# Patient Record
Sex: MALE | Race: WHITE | NOT HISPANIC OR LATINO | Employment: OTHER | URBAN - METROPOLITAN AREA
[De-identification: names, ages, dates, MRNs, and addresses within clinical notes are randomized per-mention and may not be internally consistent; named-entity substitution may affect disease eponyms.]

---

## 2017-05-03 ENCOUNTER — ALLSCRIPTS OFFICE VISIT (OUTPATIENT)
Dept: OTHER | Facility: OTHER | Age: 77
End: 2017-05-03

## 2018-01-14 VITALS
WEIGHT: 192 LBS | SYSTOLIC BLOOD PRESSURE: 130 MMHG | HEART RATE: 60 BPM | RESPIRATION RATE: 16 BRPM | TEMPERATURE: 97.6 F | DIASTOLIC BLOOD PRESSURE: 80 MMHG | BODY MASS INDEX: 26.78 KG/M2

## 2018-05-02 ENCOUNTER — OFFICE VISIT (OUTPATIENT)
Dept: SURGICAL ONCOLOGY | Facility: HOSPITAL | Age: 78
End: 2018-05-02
Payer: MEDICARE

## 2018-05-02 VITALS
WEIGHT: 194 LBS | DIASTOLIC BLOOD PRESSURE: 80 MMHG | SYSTOLIC BLOOD PRESSURE: 140 MMHG | HEIGHT: 71 IN | BODY MASS INDEX: 27.16 KG/M2 | TEMPERATURE: 98.2 F | RESPIRATION RATE: 14 BRPM | HEART RATE: 58 BPM

## 2018-05-02 DIAGNOSIS — C43.61 MALIGNANT MELANOMA OF RIGHT UPPER EXTREMITY INCLUDING SHOULDER (HCC): Primary | ICD-10-CM

## 2018-05-02 PROCEDURE — 99214 OFFICE O/P EST MOD 30 MIN: CPT | Performed by: SURGERY

## 2018-05-02 RX ORDER — FOLIC ACID 1 MG/1
TABLET ORAL
COMMUNITY

## 2018-05-02 NOTE — LETTER
May 2, 2018     Marilee Amado, [address] [address]    Patient: Bhavesh Valenzuela   YOB: 1940   Date of Visit: 5/2/2018       Dear Dr Diallo Escobedo: Thank you for referring Danay Maldonado to me for evaluation  Below are my notes for this consultation  If you have questions, please do not hesitate to call me  I look forward to following your patient along with you  Sincerely,        Jeanne Strauss MD        CC: SHERRILL Cazares MD Abigail Kleine, MD  5/2/2018 12:55 PM  Sign at close encounter     Surgical Oncology Follow Up       [place]  [address]  [place] [address]    Bhavesh Valenzuela  1940  [id number]  [place]  [address]  [place] [address]    No chief complaint on file  Assessment/Plan:    No problem-specific Assessment & Plan notes found for this encounter  There are no diagnoses linked to this encounter  Advance Care Planning/Advance Directives:  Discussed disease status, cancer treatment plans and/or cancer treatment goals with the patient  No history exists  History of Present Illness: Diagnosis and Staging: Stage Ia melanoma excised from the right shoulder October 2012   Treatment History: Wide excision right shoulder October 2012   Disease Status: AVINASH     Interval History: Mr Fara Bo is a 76-year-old man with a history of stage I melanoma excised from the right shoulder 4 5 years ago  He's here for surveillance visit with no skin complaints to report  He continues to see me as well as Dr Dago Mosher for skin surveillance  Review of Systems:  Review of Systems   Constitutional: Negative  HENT: Negative  Eyes: Negative  Respiratory: Negative  Cardiovascular: Negative  Gastrointestinal: Negative  Endocrine: Negative  Genitourinary: Negative  Musculoskeletal: Negative  Skin: Negative  Allergic/Immunologic: Negative  Neurological: Negative  Hematological: Negative  Psychiatric/Behavioral: Negative  Patient Active Problem List   Diagnosis    [facility]ural hematoma ([facility] )    Fall    Scalp laceration     Past Medical History:   Diagnosis Date    Gout     Hyperlipidemia     Melanoma ([facility] )      Past Surgical History:   Procedure Laterality Date    KNEE SURGERY Bilateral     SKIN BIOPSY      R SHOULDER -MELANOMA     Family History   Problem Relation Age of Onset    Hyperlipidemia Other      Social History     Social History    Marital status: /Civil Union     Spouse name: N/A    Number of children: N/A    Years of education: N/A     Occupational History    Not on file  Social History Main Topics    Smoking status: Never Smoker    Smokeless tobacco: Never Used    Alcohol use 1 2 oz/week     2 Cans of beer per week    Drug use: No    Sexual activity: Yes     Other Topics Concern    Not on file     Social History Narrative    No narrative on file       Current Outpatient Prescriptions:     acetaminophen (TYLENOL) 325 mg tablet, Take 2 tablets (650 mg total) by mouth every 4 (four) hours as needed for mild pain or headaches , Disp: 30 tablet, Rfl: 0    allopurinol (ZYLOPRIM) 100 mg tablet, Take 100 mg by mouth daily  , Disp: , Rfl:     Cyanocobalamin (RA VITAMIN B-12) 1000 MCG/ML LIQD, Inject as directed, Disp: , Rfl:     folic acid (FOLVITE) 1 mg tablet, Take by mouth, Disp: , Rfl:     rosuvastatin (CRESTOR) 10 MG tablet, Take 10 mg by mouth daily  , Disp: , Rfl:     Tetrahydrozoline-PEG 0 05-1 % SOLN, Apply to eye, Disp: , Rfl:   Allergies   Allergen Reactions    Horse-Derived Products      Annotation - [id number]: "horse serum"     Vitals:    05/02/18 1237   BP: 140/80   Pulse: 58   Resp: 14   Temp: 98 2 °F (36 8 °C) Physical Exam   Constitutional: He is oriented to person, place, and time  He appears well-developed and well-nourished  HENT:   Head: Normocephalic and atraumatic  Right Ear: External ear normal    Left Ear: External ear normal    Eyes: Conjunctivae and EOM are normal  Pupils are equal, round, and reactive to light  Neck: Normal range of motion  Neck supple  Cardiovascular: Normal rate, regular rhythm, normal heart sounds and intact distal pulses  Pulmonary/Chest: Effort normal and breath sounds normal    Abdominal: Soft  Bowel sounds are normal    Musculoskeletal: Normal range of motion  Lymphadenopathy:     He has no cervical adenopathy  Neurological: He is alert and oriented to person, place, and time  He has normal reflexes  Skin: Skin is warm and dry  Right shoulder excision site without evidence of recurrence visible or palpable  Cervical and axillary lymph node basins clinically negative  Psychiatric: He has a normal mood and affect  His behavior is normal  Judgment and thought content normal          Results:  Labs:  none    Imaging  No results found  I reviewed the above laboratory and imaging data  Discussion/Summary:  History of right shoulder melanoma, 5 years post resection  No evidence of recurrence visible or palpable  Presumably he is cured from this at this point  I will therefore follow him on a p r n  basis  He will continue lifelong derm surveillance with Dr Alvaro Benavides

## 2018-05-02 NOTE — PROGRESS NOTES
Surgical Oncology Follow Up       [address] [place] [address] SURGICAL ONCOLOGY [place]  [address]  [facility] [address]    Dennie Roup  1940  [id number]  [address] [place] [address] SURGICAL ONCOLOGY [place]  [address]  [place] [address]    No chief complaint on file  Assessment/Plan:    No problem-specific Assessment & Plan notes found for this encounter  RIGHT SHOULDER MELANOMA     There are no diagnoses linked to this encounter  Advance Care Planning/Advance Directives:  Discussed disease status, cancer treatment plans and/or cancer treatment goals with the patient  No history exists  History of Present Illness: Diagnosis and Staging: Stage Ia melanoma excised from the right shoulder October 2012   Treatment History: Wide excision right shoulder October 2012   Disease Status: AVINASH     Interval History: Mr Meghann Clarke is a 69-year-old man with a history of stage I melanoma excised from the right shoulder 4 5 years ago  He's here for surveillance visit with no skin complaints to report  He continues to see me as well as Dr Travis Petersen for skin surveillance  Review of Systems:  Review of Systems   Constitutional: Negative  HENT: Negative  Eyes: Negative  Respiratory: Negative  Cardiovascular: Negative  Gastrointestinal: Negative  Endocrine: Negative  Genitourinary: Negative  Musculoskeletal: Negative  Skin: Negative  Allergic/Immunologic: Negative  Neurological: Negative  Hematological: Negative  Psychiatric/Behavioral: Negative          Patient Active Problem List   Diagnosis    Subdural hematoma ([address] )    Fall    Scalp laceration     Past Medical History:   Diagnosis Date    Gout     Hyperlipidemia     Melanoma ([address] )      Past Surgical History:   Procedure Laterality Date    KNEE SURGERY Bilateral     SKIN BIOPSY      R SHOULDER -MELANOMA     Family History   Problem Relation Age of Onset    Hyperlipidemia Other      Social History     Social History    Marital status: /Civil Union     Spouse name: N/A    Number of children: N/A    Years of education: N/A     Occupational History    Not on file  Social History Main Topics    Smoking status: Never Smoker    Smokeless tobacco: Never Used    Alcohol use 1 2 oz/week     2 Cans of beer per week    Drug use: No    Sexual activity: Yes     Other Topics Concern    Not on file     Social History Narrative    No narrative on file       Current Outpatient Prescriptions:     acetaminophen (TYLENOL) 325 mg tablet, Take 2 tablets (650 mg total) by mouth every 4 (four) hours as needed for mild pain or headaches , Disp: 30 tablet, Rfl: 0    allopurinol (ZYLOPRIM) 100 mg tablet, Take 100 mg by mouth daily  , Disp: , Rfl:     Cyanocobalamin (RA VITAMIN B-12) 1000 MCG/ML LIQD, Inject as directed, Disp: , Rfl:     folic acid (FOLVITE) 1 mg tablet, Take by mouth, Disp: , Rfl:     rosuvastatin (CRESTOR) 10 MG tablet, Take 10 mg by mouth daily  , Disp: , Rfl:     Tetrahydrozoline-PEG 0 05-1 % SOLN, Apply to eye, Disp: , Rfl:   Allergies   Allergen Reactions    Horse-Derived Products      Annotation - [id number]: "horse serum"     Vitals:    05/02/18 1237   BP: 140/80   Pulse: 58   Resp: 14   Temp: 98 2 °F (36 8 °C)       Physical Exam   Constitutional: He is oriented to person, place, and time  He appears well-developed and well-nourished  HENT:   Head: Normocephalic and atraumatic  Right Ear: External ear normal    Left Ear: External ear normal    Eyes: Conjunctivae and EOM are normal  Pupils are equal, round, and reactive to light  Neck: Normal range of motion  Neck supple  Cardiovascular: Normal rate, regular rhythm, normal heart sounds and intact distal pulses  Pulmonary/Chest: Effort normal and breath sounds normal    Abdominal: Soft   Bowel sounds are normal  Musculoskeletal: Normal range of motion  Lymphadenopathy:     He has no cervical adenopathy  Neurological: He is alert and oriented to person, place, and time  He has normal reflexes  Skin: Skin is warm and dry  Right shoulder excision site without evidence of recurrence visible or palpable  Cervical and axillary lymph node basins clinically negative  Psychiatric: He has a normal mood and affect  His behavior is normal  Judgment and thought content normal          Results:  Labs:  none    Imaging  No results found  I reviewed the above laboratory and imaging data  Discussion/Summary:  History of right shoulder melanoma, 5 years post resection  No evidence of recurrence visible or palpable  Presumably he is cured from this at this point  I will therefore follow him on a p r n  basis  He will continue lifelong derm surveillance with Dr Gurinder Pagan

## 2021-03-30 PROBLEM — C43.59 MALIGNANT MELANOMA OF UPPER BACK (HCC): Status: ACTIVE | Noted: 2021-03-30

## 2021-03-31 ENCOUNTER — CLINICAL SUPPORT (OUTPATIENT)
Dept: URGENT CARE | Facility: MEDICAL CENTER | Age: 81
End: 2021-03-31
Payer: MEDICARE

## 2021-03-31 ENCOUNTER — APPOINTMENT (OUTPATIENT)
Dept: LAB | Facility: MEDICAL CENTER | Age: 81
End: 2021-03-31
Payer: MEDICARE

## 2021-03-31 ENCOUNTER — OFFICE VISIT (OUTPATIENT)
Dept: SURGICAL ONCOLOGY | Facility: CLINIC | Age: 81
End: 2021-03-31
Payer: MEDICARE

## 2021-03-31 ENCOUNTER — APPOINTMENT (OUTPATIENT)
Dept: RADIOLOGY | Facility: MEDICAL CENTER | Age: 81
End: 2021-03-31
Payer: MEDICARE

## 2021-03-31 VITALS
HEIGHT: 71 IN | BODY MASS INDEX: 27.58 KG/M2 | HEART RATE: 64 BPM | RESPIRATION RATE: 16 BRPM | WEIGHT: 197 LBS | DIASTOLIC BLOOD PRESSURE: 88 MMHG | SYSTOLIC BLOOD PRESSURE: 142 MMHG | TEMPERATURE: 97.6 F

## 2021-03-31 DIAGNOSIS — C43.59 MALIGNANT MELANOMA OF UPPER BACK (HCC): ICD-10-CM

## 2021-03-31 DIAGNOSIS — C43.59 MALIGNANT MELANOMA OF UPPER BACK (HCC): Primary | ICD-10-CM

## 2021-03-31 LAB
ALBUMIN SERPL BCP-MCNC: 4.1 G/DL (ref 3.5–5)
ALP SERPL-CCNC: 57 U/L (ref 46–116)
ALT SERPL W P-5'-P-CCNC: 32 U/L (ref 12–78)
ANION GAP SERPL CALCULATED.3IONS-SCNC: 3 MMOL/L (ref 4–13)
AST SERPL W P-5'-P-CCNC: 20 U/L (ref 5–45)
BASOPHILS # BLD AUTO: 0.05 THOUSANDS/ΜL (ref 0–0.1)
BASOPHILS NFR BLD AUTO: 1 % (ref 0–1)
BILIRUB SERPL-MCNC: 0.7 MG/DL (ref 0.2–1)
BUN SERPL-MCNC: 20 MG/DL (ref 5–25)
CALCIUM SERPL-MCNC: 8.8 MG/DL (ref 8.3–10.1)
CHLORIDE SERPL-SCNC: 106 MMOL/L (ref 100–108)
CO2 SERPL-SCNC: 30 MMOL/L (ref 21–32)
CREAT SERPL-MCNC: 1.26 MG/DL (ref 0.6–1.3)
EOSINOPHIL # BLD AUTO: 0.11 THOUSAND/ΜL (ref 0–0.61)
EOSINOPHIL NFR BLD AUTO: 1 % (ref 0–6)
ERYTHROCYTE [DISTWIDTH] IN BLOOD BY AUTOMATED COUNT: 12.7 % (ref 11.6–15.1)
GFR SERPL CREATININE-BSD FRML MDRD: 53 ML/MIN/1.73SQ M
GLUCOSE SERPL-MCNC: 99 MG/DL (ref 65–140)
HCT VFR BLD AUTO: 43.4 % (ref 36.5–49.3)
HGB BLD-MCNC: 14.5 G/DL (ref 12–17)
IMM GRANULOCYTES # BLD AUTO: 0.03 THOUSAND/UL (ref 0–0.2)
IMM GRANULOCYTES NFR BLD AUTO: 0 % (ref 0–2)
LYMPHOCYTES # BLD AUTO: 2.4 THOUSANDS/ΜL (ref 0.6–4.47)
LYMPHOCYTES NFR BLD AUTO: 26 % (ref 14–44)
MCH RBC QN AUTO: 33.1 PG (ref 26.8–34.3)
MCHC RBC AUTO-ENTMCNC: 33.4 G/DL (ref 31.4–37.4)
MCV RBC AUTO: 99 FL (ref 82–98)
MONOCYTES # BLD AUTO: 0.93 THOUSAND/ΜL (ref 0.17–1.22)
MONOCYTES NFR BLD AUTO: 10 % (ref 4–12)
NEUTROPHILS # BLD AUTO: 5.77 THOUSANDS/ΜL (ref 1.85–7.62)
NEUTS SEG NFR BLD AUTO: 62 % (ref 43–75)
NRBC BLD AUTO-RTO: 0 /100 WBCS
PLATELET # BLD AUTO: 238 THOUSANDS/UL (ref 149–390)
PMV BLD AUTO: 11.4 FL (ref 8.9–12.7)
POTASSIUM SERPL-SCNC: 4.5 MMOL/L (ref 3.5–5.3)
PROT SERPL-MCNC: 7.8 G/DL (ref 6.4–8.2)
RBC # BLD AUTO: 4.38 MILLION/UL (ref 3.88–5.62)
SODIUM SERPL-SCNC: 139 MMOL/L (ref 136–145)
WBC # BLD AUTO: 9.29 THOUSAND/UL (ref 4.31–10.16)

## 2021-03-31 PROCEDURE — 36415 COLL VENOUS BLD VENIPUNCTURE: CPT

## 2021-03-31 PROCEDURE — 93005 ELECTROCARDIOGRAM TRACING: CPT

## 2021-03-31 PROCEDURE — 85025 COMPLETE CBC W/AUTO DIFF WBC: CPT

## 2021-03-31 PROCEDURE — 80053 COMPREHEN METABOLIC PANEL: CPT

## 2021-03-31 PROCEDURE — 71046 X-RAY EXAM CHEST 2 VIEWS: CPT

## 2021-03-31 PROCEDURE — 99214 OFFICE O/P EST MOD 30 MIN: CPT | Performed by: SURGERY

## 2021-03-31 RX ORDER — LISINOPRIL 5 MG/1
5 TABLET ORAL DAILY
COMMUNITY
Start: 2021-03-10

## 2021-03-31 RX ORDER — MULTIVITAMIN
1 TABLET ORAL DAILY
COMMUNITY

## 2021-03-31 RX ORDER — PSEUDOEPHEDRINE HYDROCHLORIDE 60 MG/1
TABLET, FILM COATED ORAL
COMMUNITY

## 2021-03-31 NOTE — PATIENT INSTRUCTIONS
Pre-Surgery Instructions:   Medication Instructions    allopurinol (ZYLOPRIM) 100 mg tablet Take morning of surgery    Cyanocobalamin (RA VITAMIN B-12) 1000 MCG/ML LIQD Stop taking 1 week prior to surgery    folic acid (FOLVITE) 1 mg tablet Stop taking 1 week prior to surgery    lisinopril (ZESTRIL) 5 mg tablet Stop taking 1 days prior to surgery    Multiple Vitamin (multivitamin) tablet Stop taking 1 week prior to surgery    Propylene Glycol (Systane Balance) 0 6 % SOLN Stop taking 1 days prior to surgery    rosuvastatin (CRESTOR) 10 MG tablet Stop taking 1 days prior to surgery     Excision of Skin Lesion   WHAT YOU NEED TO KNOW:   Excision of a skin lesion is surgery to remove a piece of skin tissue  The skin tissue may be malignant (skin cancer) or it may be benign  Benign means the skin tissue does not have cancer cells and cannot spread  DISCHARGE INSTRUCTIONS:   Seek care immediately if:   · Your stitches come apart and the wound opens  Contact your healthcare provider if:   · You have pain in your incision that does not get better with medicine  · You have a fever or chills  · Your wound is red, swollen, bleeding, or draining pus  · You have questions or concerns about your condition or care  Care for your wound as directed:  Carefully wash the wound with soap and water  Dry the area and put on new, clean bandages as directed  Change your bandages when they get wet or dirty  You may take a shower 24 hours after  your surgery  Do not  soak in water (bathtub, hot tub, swimming pool) until after your stitches have been removed  Check your wound for signs of infection such as redness, swelling, or pus drainage  Follow up with your healthcare provider as directed: You will need to return to have your stitches removed  Write down your questions so you remember to ask them during your visits    © Copyright [address] Information is for End User's use only and may not be sold, redistributed or otherwise used for commercial purposes  All illustrations and images included in CareNotes® are the copyrighted property of A D A M , Inc  or Catina Oconnor  The above information is an  only  It is not intended as medical advice for individual conditions or treatments  Talk to your doctor, nurse or pharmacist before following any medical regimen to see if it is safe and effective for you  [place] Lymph Node Biopsy   WHAT YOU NEED TO KNOW:   A sentinel lymph node (SLN) biopsy can help show if cancer has spread to other places in your body  This information can help your healthcare provider decide what treatments you need  DISCHARGE INSTRUCTIONS:   Seek care immediately if:   · Blood soaks through your bandage  · Your stitches come apart  · Your bruise suddenly gets larger and feels firm  Contact your healthcare provider if:   · You have a fever or chills  · Your wound is red, swollen, or draining pus  · You have nausea or are vomiting  · Your skin is itchy, swollen, or you have a rash  · Your pain does not get better after you take medicine for pain  · You have questions or concerns about your condition or care  Medicines: You may need any of the following:  · NSAIDs , such as ibuprofen, help decrease swelling, pain, and fever  This medicine is available with or without a doctor's order  NSAIDs can cause stomach bleeding or kidney problems in certain people  If you take blood thinner medicine, always ask your healthcare provider if NSAIDs are safe for you  Always read the medicine label and follow directions  · Acetaminophen  decreases pain and fever  It is available without a doctor's order  Ask how much to take and how often to take it  Follow directions  Read the labels of all other medicines you are using to see if they also contain acetaminophen, or ask your doctor or pharmacist  Acetaminophen can cause liver damage if not taken correctly  Do not use more than 4 grams (4,000 milligrams) total of acetaminophen in one day  · Prescription pain medicine  may be given  Ask your healthcare provider how to take this medicine safely  Some prescription pain medicines contain acetaminophen  Do not take other medicines that contain acetaminophen without talking to your healthcare provider  Too much acetaminophen may cause liver damage  Prescription pain medicine may cause constipation  Ask your healthcare provider how to prevent or treat constipation  · Take your medicine as directed  Contact your healthcare provider if you think your medicine is not helping or if you have side effects  Tell him or her if you are allergic to any medicine  Keep a list of the medicines, vitamins, and herbs you take  Include the amounts, and when and why you take them  Bring the list or the pill bottles to follow-up visits  Carry your medicine list with you in case of an emergency  Care for your wound as directed:  Ask your healthcare provider when your incision can get wet  Carefully wash around the incision with soap and water  It is okay to let soap and water gently run over your incision  Do not  scrub your incision  Gently pat dry the area and put on new, clean bandages as directed  Change your bandages when they get wet or dirty  If you have strips of medical tape, let them fall of on their own  It may take 10 to 14 days for them to fall off  Check your incision every day for signs of infection, such as redness, swelling, or pus  Do not put powders or lotions on your incision  If lymph nodes have been taken from your armpit, ask your healthcare provider when you can wear deodorant  Self-care:   · Apply ice  on your incision for 15 to 20 minutes every hour or as directed  Use an ice pack, or put crushed ice in a plastic bag  Cover it with a towel before you apply it to your skin  Ice helps prevent tissue damage and decreases swelling and pain      · Elevate  your arm or leg nearest to the biopsy site as often as you can  This will help decrease swelling and pain  Prop your arm or leg on pillows or blankets to keep it elevated above the level of your heart comfortably  · Do not do strenuous activities  for 24 to 48 hours  Strenuous activities include heavy lifting, sports, or running  If lymph nodes were taken from your armpit, do not push or pull with your arm  These activities may put too much stress on your incision  Rest and take short walks around the house  Ask your healthcare provider when you can return to your normal activities  · Drink plenty of liquids  as directed  This will help flush out contrast liquid from your body  Ask how much liquid to drink each day and which liquids are best for you  Ask your healthcare provider how to prevent lymphedema and infection:  Lymphedema is fluid buildup in fatty tissues under your skin  Lymphedema may happen where lymph nodes were removed or in the arm or leg closest to this area  An infection in your skin can make lymphedema worse  Ask your healthcare provider how you can decrease your risk for skin infections and lymphedema  Follow up with your healthcare provider as directed:  Write down your questions so you remember to ask them during your visits  © Copyright [address] Information is for End User's use only and may not be sold, redistributed or otherwise used for commercial purposes  All illustrations and images included in CareNotes® are the copyrighted property of A D A M , Inc  or Catina Oconnor  The above information is an  only  It is not intended as medical advice for individual conditions or treatments  Talk to your doctor, nurse or pharmacist before following any medical regimen to see if it is safe and effective for you

## 2021-03-31 NOTE — H&P (VIEW-ONLY)
Surgical Oncology Follow Up       [address] [facility] SURGICAL ONCOLOGY [place]  [facility] [address]    Zenaida Jacobo  1940  [id number]  [facility] SURGICAL ONCOLOGY [place]  [place] [address]    Chief Complaint   Patient presents with    Follow-up     New melanoma of upper back       Assessment/Plan:    No problem-specific Assessment & Plan notes found for this encounter  Diagnoses and all orders for this visit:    Malignant melanoma of upper back ([address] )    Other orders  -     Multiple Vitamin (multivitamin) tablet; Take 1 tablet by mouth daily  -     lisinopril (ZESTRIL) 5 mg tablet; Take 5 mg by mouth daily   -     Propylene Glycol (Systane Balance) 0 6 % SOLN; Apply to eye        Advance Care Planning/Advance Directives:  Discussed disease status, cancer treatment plans and/or cancer treatment goals with the patient  Oncology History   Malignant melanoma of upper back ([address] )   3/12/2021 Initial Diagnosis    Malignant melanoma of upper back ([address] )         History of Present Illness:   Patient is an 5year-old man with history of stage I melanoma of back post prior excision in 2012  On recent derm surveillance visit, he was found have a new lesion over his upper back, in the midline  Biopsy was done confirming 0 3 mm deep melanoma with no mitoses, but with ulceration  He has been referred for evaluation and treatment     -Interval History:He follows regularly with Dr Livier Tony for surveillance  Review of Systems:  Review of Systems   Constitutional: Negative  HENT: Negative  Eyes: Negative  Respiratory: Negative  Cardiovascular: Negative  Gastrointestinal: Negative  Endocrine: Negative  Genitourinary: Negative  Musculoskeletal: Negative  Skin:        New back melanoma   Neurological: Negative  Hematological: Negative  Psychiatric/Behavioral: Negative  Patient Active Problem List   Diagnosis    [facility]dural hematoma ([facility] )    Fall, accidental    Scalp laceration    Gout    Hypercholesterolemia    Tinnitus    Hearing loss    Malignant melanoma of upper back ([facility] )     Past Medical History:   Diagnosis Date    Gout     Hyperlipidemia     Melanoma ([facility] )      Past Surgical History:   Procedure Laterality Date    KNEE SURGERY Bilateral     SKIN BIOPSY      R SHOULDER -MELANOMA     Family History   Problem Relation Age of Onset    Hyperlipidemia Other      Social History     Socioeconomic History    Marital status: /Civil Union     Spouse name: Not on file    Number of children: Not on file    Years of education: Not on file    Highest education level: Not on file   Occupational History    Not on file   Social Needs    Financial resource strain: Not on file    Food insecurity     Worry: Not on file     Inability: Not on file    Transportation needs     Medical: Not on file     Non-medical: Not on file   Tobacco Use    Smoking status: Never Smoker    Smokeless tobacco: Never Used   Substance and Sexual Activity    Alcohol use:  Yes     Alcohol/week: 2 0 standard drinks     Types: 2 Cans of beer per week    Drug use: No    Sexual activity: Yes   Lifestyle    Physical activity     Days per week: Not on file     Minutes per session: Not on file    Stress: Not on file   Relationships    Social connections     Talks on phone: Not on file     Gets together: Not on file     Attends [employer] service: Not on file     Active member of club or organization: Not on file     Attends meetings of clubs or organizations: Not on file     Relationship status: Not on file    Intimate partner violence     Fear of current or ex partner: Not on file     Emotionally abused: Not on file     Physically abused: Not on file     Forced sexual activity: Not on file   Other Topics Concern    Not on file   Social History Narrative    Not on file       Current Outpatient Medications:     allopurinol (ZYLOPRIM) 100 mg tablet, Take 100 mg by mouth daily  , Disp: , Rfl:     Cyanocobalamin (RA VITAMIN B-12) 1000 MCG/ML LIQD, Inject as directed, Disp: , Rfl:     folic acid (FOLVITE) 1 mg tablet, Take by mouth, Disp: , Rfl:     lisinopril (ZESTRIL) 5 mg tablet, Take 5 mg by mouth daily , Disp: , Rfl:     Multiple Vitamin (multivitamin) tablet, Take 1 tablet by mouth daily, Disp: , Rfl:     Propylene Glycol (Systane Balance) 0 6 % SOLN, Apply to eye, Disp: , Rfl:     rosuvastatin (CRESTOR) 10 MG tablet, Take 10 mg by mouth daily  , Disp: , Rfl:   Allergies   Allergen Reactions    Horse-Derived Products      Annotation - [id number]: "horse serum"     Vitals:    03/31/21 1321   BP: 142/88   Pulse: 64   Resp: 16   Temp: 97 6 °F (36 4 °C)       Physical Exam  Constitutional:       Appearance: Normal appearance  HENT:      Head: Normocephalic and atraumatic  Right Ear: External ear normal       Left Ear: External ear normal       Nose: Nose normal    Eyes:      General: No scleral icterus  Extraocular Movements: Extraocular movements intact  Pupils: Pupils are equal, round, and reactive to light  Neck:      Musculoskeletal: Normal range of motion and neck supple  No neck rigidity  Cardiovascular:      Rate and Rhythm: Normal rate and regular rhythm  Heart sounds: Normal heart sounds  Pulmonary:      Effort: Pulmonary effort is normal       Breath sounds: Normal breath sounds  Abdominal:      General: Abdomen is flat  Palpations: Abdomen is soft  Musculoskeletal: Normal range of motion  Lymphadenopathy:      Cervical: No cervical adenopathy  Skin:     General: Skin is warm and dry  Comments:   Midline, upper back, scab, no satellite lesions  Negative for cervical or axillary adenopathy bilaterally  Neurological:      General: No focal deficit present  Mental Status: He is alert and oriented to person, place, and time  Psychiatric:         Mood and Affect: Mood normal          Behavior: Behavior normal          Thought Content: Thought content normal          Judgment: Judgment normal            Results:  Labs:   biopsy dated March 12 2021 superior thoracic spine confirming malignant melanoma approximately 0 3 mm in depth, 0 mitoses, but with ulceration  Imaging  No results found  I reviewed the above laboratory and imaging data  Discussion/Summary:  79-year-old man, new T1a melanoma, but with ulceration  Wide excision with sentinel node biopsy mandated  Rationale for this along with risks and benefits of surgery including infection, bleeding, need for possible additional surgery, discussed with him and his wife  They understand the plan and wish to proceed as outlined  All questions answered and consents signed at this visit

## 2021-03-31 NOTE — PROGRESS NOTES
Surgical Oncology Follow Up       [address] [facility] [place] [facility] [place]  [facility] [address]    Reina Lerner  1940  [id number]  [facility] SURGICAL ONCOLOGY [place]  [place] [address]    Chief Complaint   Patient presents with    Follow-up     New melanoma of upper back       Assessment/Plan:    No problem-specific Assessment & Plan notes found for this encounter  Diagnoses and all orders for this visit:    Malignant melanoma of upper back ([address] )    Other orders  -     Multiple Vitamin (multivitamin) tablet; Take 1 tablet by mouth daily  -     lisinopril (ZESTRIL) 5 mg tablet; Take 5 mg by mouth daily   -     Propylene Glycol (Systane Balance) 0 6 % SOLN; Apply to eye        Advance Care Planning/Advance Directives:  Discussed disease status, cancer treatment plans and/or cancer treatment goals with the patient  Oncology History   Malignant melanoma of upper back ([address] )   3/12/2021 Initial Diagnosis    Malignant melanoma of upper back ([address] )         History of Present Illness:   Patient is an 5year-old man with history of stage I melanoma of back post prior excision in 2012  On recent derm surveillance visit, he was found have a new lesion over his upper back, in the midline  Biopsy was done confirming 0 3 mm deep melanoma with no mitoses, but with ulceration  He has been referred for evaluation and treatment     -Interval History:He follows regularly with Dr Eurm Pearce for surveillance  Review of Systems:  Review of Systems   Constitutional: Negative  HENT: Negative  Eyes: Negative  Respiratory: Negative  Cardiovascular: Negative  Gastrointestinal: Negative  Endocrine: Negative  Genitourinary: Negative  Musculoskeletal: Negative  Skin:        New back melanoma   Neurological: Negative  Hematological: Negative  Psychiatric/Behavioral: Negative  Patient Active Problem List   Diagnosis    Subdural hematoma ([facility] )    Fall, accidental    Scalp laceration    Gout    Hypercholesterolemia    Tinnitus    Hearing loss    Malignant melanoma of upper back ([facility] )     Past Medical History:   Diagnosis Date    Gout     Hyperlipidemia     Melanoma ([facility] )      Past Surgical History:   Procedure Laterality Date    KNEE SURGERY Bilateral     SKIN BIOPSY      R SHOULDER -MELANOMA     Family History   Problem Relation Age of Onset    Hyperlipidemia Other      Social History     Socioeconomic History    Marital status: /Civil Union     Spouse name: Not on file    Number of children: Not on file    Years of education: Not on file    Highest education level: Not on file   Occupational History    Not on file   Social Needs    Financial resource strain: Not on file    Food insecurity     Worry: Not on file     Inability: Not on file    Transportation needs     Medical: Not on file     Non-medical: Not on file   Tobacco Use    Smoking status: Never Smoker    Smokeless tobacco: Never Used   Substance and Sexual Activity    Alcohol use:  Yes     Alcohol/week: 2 0 standard drinks     Types: 2 Cans of beer per week    Drug use: No    Sexual activity: Yes   Lifestyle    Physical activity     Days per week: Not on file     Minutes per session: Not on file    Stress: Not on file   Relationships    Social connections     Talks on phone: Not on file     Gets together: Not on file     Attends Jehovah's witness service: Not on file     Active member of club or organization: Not on file     Attends meetings of clubs or organizations: Not on file     Relationship status: Not on file    Intimate partner violence     Fear of current or ex partner: Not on file     Emotionally abused: Not on file     Physically abused: Not on file     Forced sexual activity: Not on file   Other Topics Concern    Not on file   Social History Narrative    Not on file       Current Outpatient Medications:     allopurinol (ZYLOPRIM) 100 mg tablet, Take 100 mg by mouth daily  , Disp: , Rfl:     Cyanocobalamin (RA VITAMIN B-12) 1000 MCG/ML LIQD, Inject as directed, Disp: , Rfl:     folic acid (FOLVITE) 1 mg tablet, Take by mouth, Disp: , Rfl:     lisinopril (ZESTRIL) 5 mg tablet, Take 5 mg by mouth daily , Disp: , Rfl:     Multiple Vitamin (multivitamin) tablet, Take 1 tablet by mouth daily, Disp: , Rfl:     Propylene Glycol (Systane Balance) 0 6 % SOLN, Apply to eye, Disp: , Rfl:     rosuvastatin (CRESTOR) 10 MG tablet, Take 10 mg by mouth daily  , Disp: , Rfl:   Allergies   Allergen Reactions    Horse-Derived Products      Annotation - [id number]: "horse serum"     Vitals:    03/31/21 1321   BP: 142/88   Pulse: 64   Resp: 16   Temp: 97 6 °F (36 4 °C)       Physical Exam  Constitutional:       Appearance: Normal appearance  HENT:      Head: Normocephalic and atraumatic  Right Ear: External ear normal       Left Ear: External ear normal       Nose: Nose normal    Eyes:      General: No scleral icterus  Extraocular Movements: Extraocular movements intact  Pupils: Pupils are equal, round, and reactive to light  Neck:      Musculoskeletal: Normal range of motion and neck supple  No neck rigidity  Cardiovascular:      Rate and Rhythm: Normal rate and regular rhythm  Heart sounds: Normal heart sounds  Pulmonary:      Effort: Pulmonary effort is normal       Breath sounds: Normal breath sounds  Abdominal:      General: Abdomen is flat  Palpations: Abdomen is soft  Musculoskeletal: Normal range of motion  Lymphadenopathy:      Cervical: No cervical adenopathy  Skin:     General: Skin is warm and dry  Comments:   Midline, upper back, scab, no satellite lesions  Negative for cervical or axillary adenopathy bilaterally  Neurological:      General: No focal deficit present  Mental Status: He is alert and oriented to person, place, and time  Psychiatric:         Mood and Affect: Mood normal          Behavior: Behavior normal          Thought Content: Thought content normal          Judgment: Judgment normal            Results:  Labs:   biopsy dated March 12 2021 superior thoracic spine confirming malignant melanoma approximately 0 3 mm in depth, 0 mitoses, but with ulceration  Imaging  No results found  I reviewed the above laboratory and imaging data  Discussion/Summary:  79-year-old man, new T1a melanoma, but with ulceration  Wide excision with sentinel node biopsy mandated  Rationale for this along with risks and benefits of surgery including infection, bleeding, need for possible additional surgery, discussed with him and his wife  They understand the plan and wish to proceed as outlined  All questions answered and consents signed at this visit

## 2021-04-01 LAB
ATRIAL RATE: 63 BPM
P AXIS: 30 DEGREES
PR INTERVAL: 140 MS
QRS AXIS: -4 DEGREES
QRSD INTERVAL: 88 MS
QT INTERVAL: 426 MS
QTC INTERVAL: 435 MS
T WAVE AXIS: 33 DEGREES
VENTRICULAR RATE: 63 BPM

## 2021-04-01 PROCEDURE — 93010 ELECTROCARDIOGRAM REPORT: CPT | Performed by: INTERNAL MEDICINE

## 2021-04-08 ENCOUNTER — ANESTHESIA EVENT (OUTPATIENT)
Dept: PERIOP | Facility: HOSPITAL | Age: 81
End: 2021-04-08
Payer: MEDICARE

## 2021-04-08 NOTE — PRE-PROCEDURE INSTRUCTIONS
Pre-Surgery Instructions:   Medication Instructions    allopurinol (ZYLOPRIM) 100 mg tablet Instructed patient per Anesthesia Guidelines   Cyanocobalamin (RA VITAMIN B-12) 1000 MCG/ML LIQD Instructed patient per Anesthesia Guidelines   folic acid (FOLVITE) 1 mg tablet Instructed patient per Anesthesia Guidelines   lisinopril (ZESTRIL) 5 mg tablet Instructed patient per Anesthesia Guidelines   Multiple Vitamin (multivitamin) tablet Instructed patient per Anesthesia Guidelines   Propylene Glycol (Systane Balance) 0 6 % SOLN Instructed patient per Anesthesia Guidelines   rosuvastatin (CRESTOR) 10 MG tablet Instructed patient per Anesthesia Guidelines  Spoke with patient medication list reviewed  Pt will stop all vitamins and nsaids 1 week prior to surgery  pt and I discussed he will not take any medications  DOS  Mounika  Lisinipril  Npo after midnight  Pt denies covid symptoms, he has been vaccinated 2nd shot March 8, 2021  Pt understands shower instructions 1 adult visitor policy and the need for a ride home after surgery  Address given [address] [facility]  [facility] will call Monday between 2p-7p with arrival time

## 2021-04-13 ENCOUNTER — ANESTHESIA (OUTPATIENT)
Dept: PERIOP | Facility: HOSPITAL | Age: 81
End: 2021-04-13
Payer: MEDICARE

## 2021-04-13 ENCOUNTER — HOSPITAL ENCOUNTER (OUTPATIENT)
Dept: RADIOLOGY | Facility: HOSPITAL | Age: 81
Discharge: HOME | End: 2021-04-13
Attending: SURGERY
Payer: MEDICARE

## 2021-04-13 ENCOUNTER — HOSPITAL ENCOUNTER (OUTPATIENT)
Facility: HOSPITAL | Age: 81
Setting detail: OUTPATIENT SURGERY
Discharge: HOME | End: 2021-04-14
Attending: SURGERY | Admitting: SURGERY
Payer: MEDICARE

## 2021-04-13 DIAGNOSIS — C43.59 MALIGNANT MELANOMA OF UPPER BACK (HCC): ICD-10-CM

## 2021-04-13 DIAGNOSIS — C43.59 MALIGNANT MELANOMA OF UPPER BACK (HCC): Primary | ICD-10-CM

## 2021-04-13 PROCEDURE — 88342 IMHCHEM/IMCYTCHM 1ST ANTB: CPT | Performed by: PATHOLOGY

## 2021-04-13 PROCEDURE — A9541 TC99M SULFUR COLLOID: HCPCS

## 2021-04-13 PROCEDURE — 88305 TISSUE EXAM BY PATHOLOGIST: CPT | Performed by: PATHOLOGY

## 2021-04-13 PROCEDURE — 12032 INTMD RPR S/A/T/EXT 2.6-7.5: CPT | Performed by: SURGERY

## 2021-04-13 PROCEDURE — 88341 IMHCHEM/IMCYTCHM EA ADD ANTB: CPT | Performed by: PATHOLOGY

## 2021-04-13 PROCEDURE — 88307 TISSUE EXAM BY PATHOLOGIST: CPT | Performed by: PATHOLOGY

## 2021-04-13 PROCEDURE — 11606 EXC TR-EXT MAL+MARG >4 CM: CPT | Performed by: SURGERY

## 2021-04-13 PROCEDURE — 78195 LYMPH SYSTEM IMAGING: CPT

## 2021-04-13 PROCEDURE — 38510 BIOPSY/REMOVAL LYMPH NODES: CPT | Performed by: SURGERY

## 2021-04-13 PROCEDURE — G1004 CDSM NDSC: HCPCS

## 2021-04-13 RX ORDER — SODIUM CHLORIDE, SODIUM LACTATE, POTASSIUM CHLORIDE, CALCIUM CHLORIDE 600; 310; 30; 20 MG/100ML; MG/100ML; MG/100ML; MG/100ML
50 INJECTION, SOLUTION INTRAVENOUS CONTINUOUS
Status: DISCONTINUED | OUTPATIENT
Start: 2021-04-13 | End: 2021-04-14

## 2021-04-13 RX ORDER — PROPOFOL 10 MG/ML
INJECTION, EMULSION INTRAVENOUS AS NEEDED
Status: DISCONTINUED | OUTPATIENT
Start: 2021-04-13 | End: 2021-04-13

## 2021-04-13 RX ORDER — ONDANSETRON 2 MG/ML
4 INJECTION INTRAMUSCULAR; INTRAVENOUS EVERY 6 HOURS PRN
Status: DISCONTINUED | OUTPATIENT
Start: 2021-04-13 | End: 2021-04-14 | Stop reason: HOSPADM

## 2021-04-13 RX ORDER — NEOSTIGMINE METHYLSULFATE 1 MG/ML
INJECTION INTRAVENOUS AS NEEDED
Status: DISCONTINUED | OUTPATIENT
Start: 2021-04-13 | End: 2021-04-13

## 2021-04-13 RX ORDER — LIDOCAINE HYDROCHLORIDE 10 MG/ML
INJECTION, SOLUTION EPIDURAL; INFILTRATION; INTRACAUDAL; PERINEURAL AS NEEDED
Status: DISCONTINUED | OUTPATIENT
Start: 2021-04-13 | End: 2021-04-13

## 2021-04-13 RX ORDER — ROCURONIUM BROMIDE 10 MG/ML
INJECTION, SOLUTION INTRAVENOUS AS NEEDED
Status: DISCONTINUED | OUTPATIENT
Start: 2021-04-13 | End: 2021-04-13

## 2021-04-13 RX ORDER — DEXAMETHASONE SODIUM PHOSPHATE 10 MG/ML
INJECTION, SOLUTION INTRAMUSCULAR; INTRAVENOUS AS NEEDED
Status: DISCONTINUED | OUTPATIENT
Start: 2021-04-13 | End: 2021-04-13

## 2021-04-13 RX ORDER — SODIUM CHLORIDE, SODIUM LACTATE, POTASSIUM CHLORIDE, CALCIUM CHLORIDE 600; 310; 30; 20 MG/100ML; MG/100ML; MG/100ML; MG/100ML
INJECTION, SOLUTION INTRAVENOUS CONTINUOUS PRN
Status: DISCONTINUED | OUTPATIENT
Start: 2021-04-13 | End: 2021-04-13

## 2021-04-13 RX ORDER — GLYCOPYRROLATE 0.2 MG/ML
INJECTION INTRAMUSCULAR; INTRAVENOUS AS NEEDED
Status: DISCONTINUED | OUTPATIENT
Start: 2021-04-13 | End: 2021-04-13

## 2021-04-13 RX ORDER — SODIUM CHLORIDE, SODIUM LACTATE, POTASSIUM CHLORIDE, CALCIUM CHLORIDE 600; 310; 30; 20 MG/100ML; MG/100ML; MG/100ML; MG/100ML
125 INJECTION, SOLUTION INTRAVENOUS CONTINUOUS
Status: DISCONTINUED | OUTPATIENT
Start: 2021-04-13 | End: 2021-04-14

## 2021-04-13 RX ORDER — ISOSULFAN BLUE 50 MG/5ML
INJECTION, SOLUTION SUBCUTANEOUS AS NEEDED
Status: DISCONTINUED | OUTPATIENT
Start: 2021-04-13 | End: 2021-04-13 | Stop reason: HOSPADM

## 2021-04-13 RX ORDER — ONDANSETRON 2 MG/ML
4 INJECTION INTRAMUSCULAR; INTRAVENOUS ONCE AS NEEDED
Status: DISCONTINUED | OUTPATIENT
Start: 2021-04-13 | End: 2021-04-13 | Stop reason: HOSPADM

## 2021-04-13 RX ORDER — TRAMADOL HYDROCHLORIDE 50 MG/1
50 TABLET ORAL EVERY 6 HOURS PRN
Qty: 10 TABLET | Refills: 0 | Status: SHIPPED | OUTPATIENT
Start: 2021-04-13 | End: 2021-04-16 | Stop reason: SDUPTHER

## 2021-04-13 RX ORDER — OXYCODONE HYDROCHLORIDE 5 MG/1
5 TABLET ORAL EVERY 4 HOURS PRN
Status: DISCONTINUED | OUTPATIENT
Start: 2021-04-13 | End: 2021-04-14

## 2021-04-13 RX ORDER — ACETAMINOPHEN 325 MG/1
650 TABLET ORAL EVERY 4 HOURS PRN
Status: DISCONTINUED | OUTPATIENT
Start: 2021-04-13 | End: 2021-04-14 | Stop reason: HOSPADM

## 2021-04-13 RX ORDER — FENTANYL CITRATE 50 UG/ML
INJECTION, SOLUTION INTRAMUSCULAR; INTRAVENOUS AS NEEDED
Status: DISCONTINUED | OUTPATIENT
Start: 2021-04-13 | End: 2021-04-13

## 2021-04-13 RX ORDER — FENTANYL CITRATE/PF 50 MCG/ML
50 SYRINGE (ML) INJECTION
Status: DISCONTINUED | OUTPATIENT
Start: 2021-04-13 | End: 2021-04-13 | Stop reason: HOSPADM

## 2021-04-13 RX ORDER — ONDANSETRON 2 MG/ML
INJECTION INTRAMUSCULAR; INTRAVENOUS AS NEEDED
Status: DISCONTINUED | OUTPATIENT
Start: 2021-04-13 | End: 2021-04-13

## 2021-04-13 RX ADMIN — ONDANSETRON 4 MG: 2 INJECTION INTRAMUSCULAR; INTRAVENOUS at 18:06

## 2021-04-13 RX ADMIN — FENTANYL CITRATE 50 MCG: 50 INJECTION INTRAMUSCULAR; INTRAVENOUS at 17:51

## 2021-04-13 RX ADMIN — PHENYLEPHRINE HYDROCHLORIDE 200 MCG: 10 INJECTION INTRAVENOUS at 18:07

## 2021-04-13 RX ADMIN — SODIUM CHLORIDE, SODIUM LACTATE, POTASSIUM CHLORIDE, AND CALCIUM CHLORIDE 125 ML/HR: .6; .31; .03; .02 INJECTION, SOLUTION INTRAVENOUS at 12:40

## 2021-04-13 RX ADMIN — PHENYLEPHRINE HYDROCHLORIDE 200 MCG: 10 INJECTION INTRAVENOUS at 18:21

## 2021-04-13 RX ADMIN — DEXAMETHASONE SODIUM PHOSPHATE 4 MG: 10 INJECTION, SOLUTION INTRAMUSCULAR; INTRAVENOUS at 18:06

## 2021-04-13 RX ADMIN — LIDOCAINE HYDROCHLORIDE 50 MG: 10 INJECTION, SOLUTION EPIDURAL; INFILTRATION; INTRACAUDAL; PERINEURAL at 17:51

## 2021-04-13 RX ADMIN — GLYCOPYRROLATE 0.4 MG: 0.2 INJECTION, SOLUTION INTRAMUSCULAR; INTRAVENOUS at 19:44

## 2021-04-13 RX ADMIN — ROCURONIUM BROMIDE 40 MG: 50 INJECTION, SOLUTION INTRAVENOUS at 17:51

## 2021-04-13 RX ADMIN — FENTANYL CITRATE 50 MCG: 50 INJECTION INTRAMUSCULAR; INTRAVENOUS at 18:55

## 2021-04-13 RX ADMIN — PROPOFOL 170 MG: 10 INJECTION, EMULSION INTRAVENOUS at 17:51

## 2021-04-13 RX ADMIN — NEOSTIGMINE METHYLSULFATE 3 MG: 1 INJECTION INTRAVENOUS at 19:44

## 2021-04-13 NOTE — OP NOTE
OPERATIVE REPORT  PATIENT NAME: Tacos oRque    :  1940  MRN: [id number]  Pt Location: BE OR ROOM 15    SURGERY DATE: 2021    Surgeon(s) and Role:     * Stephanie Eaton MD - Primary     * Escobar Kemp MD - Assisting    Preop Diagnosis:  Malignant melanoma of upper back ([address] ) [C43 59]    Post-Op Diagnosis Codes:     * Malignant melanoma of upper back ([address] ) [C43 59]    Procedure(s) (LRB):  WIDE EXCISION MELANOMA MID UPPER BACK, lymphoscintography intraoperative lymphatic mapping, (N/A)  BIOPSY LYMPH NODE SENTINEL deep cervical (Left)   Injection of radioactive dye and blue dye  Closure of defect measuring 5 0 x 2 5 x 1 0 cm in size  Specimen(s):  ID Type Source Tests Collected by Time Destination   1 : left back melanoma Tissue Soft Tissue, Other TISSUE EXAM Stephanie Eaton MD 2021    2 : left neck sentinel lymph node Tissue Lymph Node, [place] TISSUE EXAM Stephanie Eaton MD 2021 192        Estimated Blood Loss:   Minimal    Drains:  * No LDAs found *    Anesthesia Type:   General    Operative Indications:  Malignant melanoma of upper back (HCC) [C43 59]  0 3 mm deep, but with ulceration  Operative Findings:  Left upper back defect measuring 5 0 x 2 5 x 1 0 cm in size  Left posterior triangle sentinel node with gamma count of 200  Complications:   None    Procedure and Technique:  Patient is an 69-year-old man with a melanoma on his left upper back  This measures 0 3 mm in depth, though had ulceration  He therefore comes in for wide excision and sentinel node biopsy  He was 1st seen in nuclear Medicine where he underwent injection of radioactive technetium  Lymphoscintigraphy revealed area of uptake in the left posterior neck  This was marked as left cervical sentinel node  He was taken the OR and identified by proper time-out  Following this he was intubated by the anesthesia team   He is in position in the left lateral decubitus position    The left upper back melanoma was visualized  1 cm margins were drawn around it  The area was then prepped and draped in the usual fashion  The surrounding skin was anesthetized, then the marked lines incised sharply  Cautery was used to obtain a full-thickness excision  Specimen was oriented with sutures and clips  Defect measured 5 0 x 2 5 x 1 0 cm in size  Closure was performed by undermining scan, the using 2-0 Vicryl to close the deep layers and dermis, followed by 2 0 nylon to close the skin in vertical mattress fashion, followed by 4 Monocryl for subcuticular skin closure  Benzoin and Steri-Strips were then applied followed by gauze and Tegaderm  Patient was then repositioned after being re-prepped and draped with access to the left neck  He was in the supine position  The sentinel node biopsy site was re-identified  The area was then anesthetized, then as skin incision made  Cautery was used to dissect through the dermis and through the platysma  This put us at the posterior triangle  The spinal accessory nerve was actually visualized  Using a gamma probe, we localized the sentinel node which was adherent to the nerve  This was dissected out from the surrounding tissue and from the nerve in hemostatic fashion with care to avoid injury to the therapy  Maximum gamma count of node upon removal was 200  No significant radio activity was left in the field upon removal of this node  We then irrigated, then closed the platysma with a running 3-0 Vicryl suture  We then closed the skin with a 4 Monocryl subcuticular skin closure  Skin glue was then applied for final closure  Sponge and instrument counts were correct at the end the case     I was present for all portions of the procedure    Patient Disposition:  PACU     SIGNATURE: Eddy Cain MD  DATE: April 13, 2021  TIME: 7:53 PM

## 2021-04-13 NOTE — ANESTHESIA PREPROCEDURE EVALUATION
Called patient for pre op call, and she stated that the office called and told her that her surgery is postponed due to a postitive urine culture. She has already started her course of antibiotics. Medical History    History Comments   Melanoma ([facility] )    Hyperlipidemia    Gout    Cancer ([facility] ) melanoma right shoulder     Procedure:  WIDE EXCISION MELANOMA MID UPPER BACK, lymphoscintigraphy intraoperative lymphatic mapping, sentinel  node biopsy (N/A Back)  BIOPSY LYMPH NODE SENTINEL (N/A Axilla)    Relevant Problems   ANESTHESIA (within normal limits)      CARDIO   (+) Hypercholesterolemia      MUSCULOSKELETAL   (+) Gout        Physical Exam    Airway    Mallampati score: II  TM Distance: >3 FB  Neck ROM: full     Dental   No notable dental hx     Cardiovascular  Rate: normal,     Pulmonary  Pulmonary exam normal     Other Findings        Anesthesia Plan  ASA Score- 2     Anesthesia Type- general with ASA Monitors  Additional Monitors:   Airway Plan: ETT  Plan Factors-Exercise tolerance (METS): >4 METS  Chart reviewed  Patient summary reviewed  Patient is not a current smoker  Induction- intravenous  Postoperative Plan- Plan for postoperative opioid use  Informed Consent- Anesthetic plan and risks discussed with patient  I personally reviewed this patient with the CRNA  Discussed and agreed on the Anesthesia Plan with the CRNA  Bruce Nix

## 2021-04-13 NOTE — ANESTHESIA POSTPROCEDURE EVALUATION
Post-Op Assessment Note    CV Status:  Stable  Pain Score: 1    Pain management: adequate     Mental Status:  Sleepy   Hydration Status:  Stable   PONV Controlled:  Controlled      Post Op Vitals Reviewed: Yes      Staff: Anesthesiologist, CRNA         No complications documented      BP     Temp     Pulse     Resp      SpO2

## 2021-04-13 NOTE — INTERVAL H&P NOTE
H&P reviewed  After examining the patient I find no changes in the patients condition since the H&P had been written      Vitals:    04/13/21 1216   BP: (!) 181/110   Pulse: 68   Resp: 16   Temp: (!) 97 3 °F (36 3 °C)   SpO2: 99%

## 2021-04-14 VITALS
SYSTOLIC BLOOD PRESSURE: 166 MMHG | RESPIRATION RATE: 20 BRPM | TEMPERATURE: 98.4 F | HEART RATE: 88 BPM | OXYGEN SATURATION: 96 % | HEIGHT: 72 IN | BODY MASS INDEX: 26.68 KG/M2 | WEIGHT: 197 LBS | DIASTOLIC BLOOD PRESSURE: 82 MMHG

## 2021-04-14 PROCEDURE — 99024 POSTOP FOLLOW-UP VISIT: CPT | Performed by: SURGERY

## 2021-04-14 RX ORDER — ALLOPURINOL 100 MG/1
100 TABLET ORAL DAILY
Status: DISCONTINUED | OUTPATIENT
Start: 2021-04-14 | End: 2021-04-14 | Stop reason: HOSPADM

## 2021-04-14 RX ORDER — TAMSULOSIN HYDROCHLORIDE 0.4 MG/1
0.4 CAPSULE ORAL DAILY
Status: DISCONTINUED | OUTPATIENT
Start: 2021-04-14 | End: 2021-04-14 | Stop reason: HOSPADM

## 2021-04-14 RX ORDER — PRAVASTATIN SODIUM 80 MG/1
80 TABLET ORAL
Status: DISCONTINUED | OUTPATIENT
Start: 2021-04-14 | End: 2021-04-14 | Stop reason: HOSPADM

## 2021-04-14 RX ORDER — LISINOPRIL 5 MG/1
5 TABLET ORAL DAILY
Status: DISCONTINUED | OUTPATIENT
Start: 2021-04-14 | End: 2021-04-14 | Stop reason: HOSPADM

## 2021-04-14 RX ORDER — TRAMADOL HYDROCHLORIDE 50 MG/1
50 TABLET ORAL EVERY 6 HOURS PRN
Status: DISCONTINUED | OUTPATIENT
Start: 2021-04-14 | End: 2021-04-14 | Stop reason: HOSPADM

## 2021-04-14 RX ADMIN — TAMSULOSIN HYDROCHLORIDE 0.4 MG: 0.4 CAPSULE ORAL at 02:59

## 2021-04-14 NOTE — PLAN OF CARE
Problem: PAIN - ADULT  Goal: Verbalizes/displays adequate comfort level or baseline comfort level  Description: Interventions:  - Encourage patient to monitor pain and request assistance  - Assess pain using appropriate pain scale  - Administer analgesics based on type and severity of pain and evaluate response  - Implement non-pharmacological measures as appropriate and evaluate response  - Consider cultural and social influences on pain and pain management  - Notify physician/advanced practitioner if interventions unsuccessful or patient reports new pain  Outcome: Progressing     Problem: INFECTION - ADULT  Goal: Absence or prevention of progression during hospitalization  Description: INTERVENTIONS:  - Assess and monitor for signs and symptoms of infection  - Monitor lab/diagnostic results  - Monitor all insertion sites, i e  indwelling lines, tubes, and drains  - Monitor endotracheal if appropriate and nasal secretions for changes in amount and color  - [place] appropriate cooling/warming therapies per order  - Administer medications as ordered  - Instruct and encourage patient and family to use good hand hygiene technique  - Identify and instruct in appropriate isolation precautions for identified infection/condition  Outcome: Progressing  Goal: Absence of fever/infection during neutropenic period  Description: INTERVENTIONS:  - Monitor WBC    Outcome: Progressing     Problem: SAFETY ADULT  Goal: Patient will remain free of falls  Description: INTERVENTIONS:  - Assess patient frequently for physical needs  -  Identify cognitive and physical deficits and behaviors that affect risk of falls    -  [place] fall precautions as indicated by assessment   - Educate patient/family on patient safety including physical limitations  - Instruct patient to call for assistance with activity based on assessment  - Modify environment to reduce risk of injury  - Consider OT/PT consult to assist with strengthening/mobility  Outcome: Progressing  Goal: Maintain or return to baseline ADL function  Description: INTERVENTIONS:  -  Assess patient's ability to carry out ADLs; assess patient's baseline for ADL function and identify physical deficits which impact ability to perform ADLs (bathing, care of mouth/teeth, toileting, grooming, dressing, etc )  - Assess/evaluate cause of self-care deficits   - Assess range of motion  - Assess patient's mobility; develop plan if impaired  - Assess patient's need for assistive devices and provide as appropriate  - Encourage maximum independence but intervene and supervise when necessary  - Involve family in performance of ADLs  - Assess for home care needs following discharge   - Consider OT consult to assist with ADL evaluation and planning for discharge  - Provide patient education as appropriate  Outcome: Progressing  Goal: Maintain or return mobility status to optimal level  Description: INTERVENTIONS:  - Assess patient's baseline mobility status (ambulation, transfers, stairs, etc )    - Identify cognitive and physical deficits and behaviors that affect mobility  - Identify mobility aids required to assist with transfers and/or ambulation (gait belt, sit-to-stand, lift, walker, cane, etc )  - [place] fall precautions as indicated by assessment  - Record patient progress and toleration of activity level on Mobility SBAR; progress patient to next Phase/Stage  - Instruct patient to call for assistance with activity based on assessment  - Consider rehabilitation consult to assist with strengthening/weightbearing, etc   Outcome: Progressing     Problem: DISCHARGE PLANNING  Goal: Discharge to home or other facility with appropriate resources  Description: INTERVENTIONS:  - Identify barriers to discharge w/patient and caregiver  - Arrange for needed discharge resources and transportation as appropriate  - Identify discharge learning needs (meds, wound care, etc )  - Arrange for interpretive services to assist at discharge as needed  - Refer to Case Management Department for coordinating discharge planning if the patient needs post-hospital services based on physician/advanced practitioner order or complex needs related to functional status, cognitive ability, or social support system  Outcome: Progressing     Problem: Knowledge Deficit  Goal: Patient/family/caregiver demonstrates understanding of disease process, treatment plan, medications, and discharge instructions  Description: Complete learning assessment and assess knowledge base    Interventions:  - Provide teaching at level of understanding  - Provide teaching via preferred learning methods  Outcome: Progressing

## 2021-04-14 NOTE — PERIOPERATIVE NURSING NOTE
Pt's wife at bedside at 2100, very upset and angry, states she has been at the hospital since 12:30PM, has not been updated, states at this point of time she will not drive herself and her  home  Communicated this to receiving MIHIR Schultz  Supervisor made aware

## 2021-04-14 NOTE — DISCHARGE INSTRUCTIONS
Discharge Instructions     Please go to your post-operative clinic visit on 4/30/2021 11:00 AM (Arrive by 10:45 AM)  Activity:  - No lifting greater than 20 pounds or strenuous physical activity or exercise for at least 4 weeks  - Walking and normal light activities are encouraged  - Normal daily activities including climbing steps are okay  - No driving until no longer using opioid pain medications  Diet:    - You may resume your normal diet  Wound Care:  - Your neck incision was closed with absorbable suture and covered with a special surgical glue  - Do NOT pick or peel the glue  It will come off on its own when the incision under it has healed in 1-2 weeks  - Do NOT soak incisions under water such as in a bathtub, hot tub, or pool for 2 weeks  - You may shower and let soapy water run over your incisions, then gently dab dry  Do NOT scrub  - Your back incision was closed with absorbable and non-absorbable suture  The non-absorbable suture will be removed at your next clinic visit  - Do not apply any creams or ointments unless instructed to do so by your surgeon   - Jacki Tuttle may apply ice as needed (no longer than 20 minutes an hour) for the first 48 hours  - Bruising is not unusual and will go away with a little time  Medications:    - You may resume all of your regular medications, including blood thinners and aspirin, after going home unless otherwise instructed  Please refer to your discharge medication list for further details  - Please take the pain medications as directed  - You are encouraged to use non-narcotic pain medications first and whenever possible  Reserve the use of narcotic pain medication for moderate to severe pain not controlled by non-narcotic medications   - No driving while taking narcotic pain medications  - You may become constipated, especially if taking pain medications  You may take any over the counter stool softeners or laxatives as needed   Examples: Milk of Magnesia, Colace, Senna  Additional Instructions:  - If you have any questions or concerns after discharge please call the office   - Call office or return to ER if fever greater than 101, chills, persistent nausea/vomiting, worsening/uncontrollable pain, and/or increasing redness or purulent/foul smelling drainage from incision(s)

## 2021-04-14 NOTE — PROGRESS NOTES
Progress / Post-op Check Note - Surgical Oncology   Anita Vasquez 79 y o  male MRN: [id number]  Unit/Bed#: [facility] 319-01 Encounter: [id number]    Assessment:  79 y o  male POD1 from back melanoma excision with SLNB, stayed for the night for social reasons    Required straight cath x1 for about 900cc overnight    Plan:  Discharge today  Ordered flomax for urinary retention  Confirm that patient is voiding without difficulty prior to discharge    Subjective/Objective     Subjective: No complaints      Objective:     Vitals: Temp:  [97 3 °F (36 3 °C)-98 5 °F (36 9 °C)] 98 4 °F (36 9 °C)  HR:  [62-88] 88  Resp:  [16-24] 20  BP: (150-181)/() 166/82  Body mass index is 26 72 kg/m²  I/O       04/12 0701 - 04/13 0700 04/13 0701 - 04/14 0700    P  O   720    I V  (mL/kg)  200 (2 2)    Total Intake(mL/kg)  920 (10 3)    Net  +920                Physical Exam:  GEN: NAD  HEENT: MMM  CV: RRR  Lung: Normal effort  Ab: Soft, ND/NT   Extrem: No CCE   Neuro: A+Ox3     Neck incision CDI, back incision covered with gauze, small amount of SS strikethrough    Lab, Imaging and other studies: I have personally reviewed pertinent reports    , CBC with diff: No results found for: WBC, HGB, HCT, MCV, PLT, ADJUSTEDWBC, MCH, MCHC, RDW, MPV, NRBC, BMP/CMP: No results found for: SODIUM, K, CL, CO2, ANIONGAP, BUN, CREATININE, GLUCOSE, CALCIUM, AST, ALT, ALKPHOS, PROT, BILITOT, EGFR  VTE Pharmacologic Prophylaxis: Sequential compression device (Venodyne)   VTE Mechanical Prophylaxis: sequential compression device        Luis Dowell MD  4/14/2021 1:45 AM

## 2021-04-16 DIAGNOSIS — C43.59 MALIGNANT MELANOMA OF UPPER BACK (HCC): ICD-10-CM

## 2021-04-16 RX ORDER — TRAMADOL HYDROCHLORIDE 50 MG/1
50 TABLET ORAL EVERY 6 HOURS PRN
Qty: 10 TABLET | Refills: 0 | Status: SHIPPED | OUTPATIENT
Start: 2021-04-16

## 2021-04-30 ENCOUNTER — OFFICE VISIT (OUTPATIENT)
Dept: SURGICAL ONCOLOGY | Facility: CLINIC | Age: 81
End: 2021-04-30

## 2021-04-30 VITALS
BODY MASS INDEX: 26.68 KG/M2 | WEIGHT: 197 LBS | SYSTOLIC BLOOD PRESSURE: 140 MMHG | DIASTOLIC BLOOD PRESSURE: 78 MMHG | TEMPERATURE: 98 F | HEIGHT: 72 IN | HEART RATE: 64 BPM | RESPIRATION RATE: 18 BRPM | OXYGEN SATURATION: 98 %

## 2021-04-30 DIAGNOSIS — C43.59 MALIGNANT MELANOMA OF UPPER BACK (HCC): Primary | ICD-10-CM

## 2021-04-30 PROCEDURE — 99024 POSTOP FOLLOW-UP VISIT: CPT | Performed by: SURGERY

## 2021-04-30 PROCEDURE — 1124F ACP DISCUSS-NO DSCNMKR DOCD: CPT | Performed by: SURGERY

## 2021-04-30 RX ORDER — TAMSULOSIN HYDROCHLORIDE 0.4 MG/1
CAPSULE ORAL
COMMUNITY
Start: 2021-04-08

## 2021-04-30 RX ORDER — FINASTERIDE 5 MG/1
TABLET, FILM COATED ORAL
COMMUNITY
Start: 2021-04-08

## 2021-04-30 NOTE — PROGRESS NOTES
Surgical Oncology Follow Up       [address] [facility] [address]    Monty Aviles  1940  [id number]  [facility] SURGICAL ONCOLOGY [place]  Alexa Shankar [address]    Chief Complaint   Patient presents with    Post-op       Assessment/Plan:    No problem-specific Assessment & Plan notes found for this encounter  Diagnoses and all orders for this visit:    Malignant melanoma of upper back ([address] )    Other orders  -     tamsulosin (FLOMAX) 0 4 mg  -     finasteride (PROSCAR) 5 mg tablet        Advance Care Planning/Advance Directives:  Discussed disease status, cancer treatment plans and/or cancer treatment goals with the patient  Oncology History   Malignant melanoma of upper back ([address] )   3/12/2021 Biopsy    Thoracic spine shave biopsy:  Melanoma 0 4mm  No mitosis  No ulceration     4/13/2021 Surgery    Left back melanoma excision with SLN:  - Inked margins with no tumor seen  - No tumor seen in one lymph node (0/1)         History of Present Illness: The patient is an 14-year-old man status post excision of left back melanoma, with sentinel node biopsy   -Interval History:  He is here for postop check  He had no postop issues with regards to his incision sites  Review of Systems:  Review of Systems   Skin: Positive for wound         Patient Active Problem List   Diagnosis    Subdural hematoma ([address] )    Fall, accidental    Scalp laceration    Gout    Hypercholesterolemia    Tinnitus    Hearing loss    Malignant melanoma of upper back [facility])     Past Medical History:   Diagnosis Date    Cancer ([address] )     melanoma right shoulder    Gout     Hyperlipidemia     Melanoma ([address] )      Past Surgical History:   Procedure Laterality Date    COLONOSCOPY      KNEE SURGERY Bilateral     LYMPH NODE BIOPSY Left 4/13/2021    Procedure: BIOPSY LYMPH NODE SENTINEL neck; Surgeon: Eliot eReves MD;  [facility];  Service: Surgical Oncology    SKIN BIOPSY      R SHOULDER -MELANOMA    SKIN LESION EXCISION N/A 4/13/2021    Procedure: WIDE EXCISION MELANOMA MID UPPER BACK, lymphoscintography intraoperative lymphatic mapping,;  Surgeon: Eliot Reeves MD;  [facility];  Service: Surgical Oncology     Family History   Problem Relation Age of Onset    Hyperlipidemia Other      Social History     Socioeconomic History    Marital status: /Civil Union     Spouse name: Not on file    Number of children: Not on file    Years of education: Not on file    Highest education level: Not on file   Occupational History    Not on file   Social Needs    Financial resource strain: Not on file    Food insecurity     Worry: Not on file     Inability: Not on file   [employer] needs     Medical: Not on file     Non-medical: Not on file   Tobacco Use    Smoking status: Never Smoker    Smokeless tobacco: Never Used   Substance and Sexual Activity    Alcohol use:  Yes     Alcohol/week: 2 0 standard drinks     Types: 2 Cans of beer per week     Frequency: 2-3 times a week     Drinks per session: 1 or 2     Comment: occasional    Drug use: No    Sexual activity: Yes   Lifestyle    Physical activity     Days per week: Not on file     Minutes per session: Not on file    Stress: Not on file   Relationships    Social connections     Talks on phone: Not on file     Gets together: Not on file     Attends Yarsanism service: Not on file     Active member of club or organization: Not on file     Attends meetings of clubs or organizations: Not on file     Relationship status: Not on file    Intimate partner violence     Fear of current or ex partner: Not on file     Emotionally abused: Not on file     Physically abused: Not on file     Forced sexual activity: Not on file   Other Topics Concern    Not on file   Social History Narrative    Not on file       Current Outpatient Medications:     allopurinol (ZYLOPRIM) 100 mg tablet, Take 100 mg by mouth daily  , Disp: , Rfl:     Cyanocobalamin (RA VITAMIN B-12) 1000 MCG/ML LIQD, Inject as directed, Disp: , Rfl:     finasteride (PROSCAR) 5 mg tablet, , Disp: , Rfl:     folic acid (FOLVITE) 1 mg tablet, Take by mouth, Disp: , Rfl:     lisinopril (ZESTRIL) 5 mg tablet, Take 5 mg by mouth daily , Disp: , Rfl:     Multiple Vitamin (multivitamin) tablet, Take 1 tablet by mouth daily, Disp: , Rfl:     Propylene Glycol (Systane Balance) 0 6 % SOLN, Apply to eye, Disp: , Rfl:     rosuvastatin (CRESTOR) 10 MG tablet, Take 10 mg by mouth daily  , Disp: , Rfl:     tamsulosin (FLOMAX) 0 4 mg, , Disp: , Rfl:     traMADol (ULTRAM) 50 mg tablet, Take 1 tablet (50 mg total) by mouth every 6 (six) hours as needed for moderate pain, Disp: 10 tablet, Rfl: 0  Allergies   Allergen Reactions    Horse-Derived Products      Annotation - [id number]: "horse serum"     Vitals:    04/30/21 1040   BP: 140/78   Pulse: 64   Resp: 18   Temp: 98 °F (36 7 °C)   SpO2: 98%       Physical Exam  Constitutional:       Appearance: Normal appearance  Skin:     Comments: Midback and left cervical incision is clean dry intact  Neurological:      Mental Status: He is alert             Results:  Labs:    Case Report   Surgical Pathology Report                         Case: [id number]                                    Authorizing Provider: Antonette Kidd MD        Collected:           04/13/2021 1818               Ordering Location:     [facility]      Received:            04/14/2021 [facility]                                      Hospital Operating Room                                                       Pathologist:           Magdiel Kaur MD                                                                   Specimens:   A) - Skin, Other, left back melanoma                                                                 B) - Lymph Node, Ione, left neck sentinel lymph node                                   Final Diagnosis   A  Skin, left back, excision:  - Prior biopsy site reaction, no residual tumor seen  - Inked margins with no tumor seen        B  Left neck sentinel lymph node, biopsy:  - No tumor seen in one lymph node (0/1); see note      Note:  Immunostains for S100, HMB45 and Mart 1 do not reveal metastatic melanoma          Interpretation performed at [facility], [address] [place] [address], [address]       Electronically signed by Kristen Mosher MD on 4/21/2021 at  2:13 PM     Case Report   Surgical Pathology Report                         Case: [id number]                                    Authorizing Provider: Mark More MD        Collected:           04/05/2021 [facility]               Ordering Location:     [address]      Received:            04/05/2021 [address] [place] [address] Specialty                                                                                   Laboratory                                                                    Pathologist:           Monie Mora MD                                                            Specimen:    Skin, Other, Superior thoracic spine ( 1 slide [id number] [facility],                      collected 3/12/2121)                                                                       Final Diagnosis   A  Skin, superior thoracic spine ( 1 slide [id number] [facility], collected 3/12/2121):     Consistent with MELANOMA (thickness: 0 4 mm) (see note)      Note: Please see synoptic report for additional details     Electronically signed by Monie Mora MD on 4/5/2021 at  9:58 AM   Additional Information    All reported additional testing was performed with appropriately reactive controls   These tests were developed and their performance characteristics determined by [address] [place] [address] or appropriate performing facility, though some tests may be performed on tissues which have not been validated for performance characteristics (such as staining performed on alcohol exposed cell blocks and decalcified tissues)   Results should be interpreted with caution and in the context of the patients clinical condition  These tests may not be cleared or approved by the U S  Food and Drug Administration, though the FDA has determined that such clearance or approval is not necessary  These tests are used for clinical purposes and they should not be regarded as investigational or for research  This laboratory has been approved by [facility], designated as a high-complexity laboratory and is qualified to perform these tests      Synoptic Checklist   MELANOMA OF THE SKIN: Biopsy  MELANOMA OF THE SKIN: BIOPSY - All Specimens  8th Edition - Protocol posted: 4/29/2020  SPECIMEN   Procedure  Biopsy, shave    Specimen Laterality  Not specified    TUMOR   Tumor Site  Skin of trunk: superior thoracic spine         Histologic Type  Superficial spreading melanoma (low-cumulative sun damage (CSD) melanoma)    Maximum Tumor (Breslow) Thickness (Millimeters)  0 4 mm   Ulceration  Cannot be determined: Present (possibly due to trauma/excoriation)    Anatomic (Syed) Level  III (melanoma fills and expands papillary dermis)    Mitotic Rate  None identified    Microsatellite(s)  Not identified    Lymphovascular Invasion  Not identified    Neurotropism  Not identified    Tumor Regression  Not identified    MARGINS     Peripheral Margins  Negative for invasive melanoma    Status of Melanoma in situ at Peripheral Margins  Cannot be assessed: Approaches peripheral specimen margin    Deep Margin  Negative for invasive melanoma    PATHOLOGIC STAGE CLASSIFICATION (pTNM, AJCC 8th Edition)     Primary Tumor (pT)  pT1a           Imaging  Xr Chest Pa & Lateral    Result Date: 4/3/2021  Narrative: CHEST INDICATION:   C43 59: Malignant melanoma of other part of trunk   COMPARISON: Chest radiograph from 10/4/2013  EXAM PERFORMED/VIEWS:  XR CHEST PA & LATERAL  DUAL ENERGY SUBTRACTION  FINDINGS: Cardiomediastinal silhouette normal  Lungs clear  No effusion or pneumothorax  Moderate degenerative disease in the spine  Impression: No acute cardiopulmonary disease  Workstation performed: [id number]     Nm Lymphatic Melanoma    Result Date: 4/13/2021  Narrative: SENTINEL NODE LYMPHOSCINTIGRAPHY INDICATION:   Mid back melanoma  FINDINGS: 0 512 mCi Tc-99m sulfur colloid (0 6 cc volume) was administered intradermally in divided doses by Dr Sterling Osborne in the region of the patients MID upper back melanoma  Scintigraphic images were obtained over the left supraclavicular soft tissues in multiple projections  Using scintigraphic guidance, the corresponding skin site was marked with an indelible marker  The patient was transferred to the operating room in satisfactory condition  Impression: 1  [place] lymph node localized to the left supraclavicular soft tissues  Workstation performed: [id number]     I reviewed the above laboratory and imaging data  Discussion/Summary:  History of stage I melanoma back, with negative sentinel node  I recommended every 3 6 month surveillance visit  He lives at least an hour [place] of here  He already sees Dr Ketty Iverson  [place] to home  He will therefore plan on seeing him on a regular basis  I will therefore see him as needed

## 2021-04-30 NOTE — LETTER
April 30, 2021     [place], [address] [address]    Patient: Kiana Harrell   YOB: 1940   Date of Visit: 4/30/2021       Dear Dr Jaden Solomon: Thank you for referring Jeramy Celestin to me for evaluation  Below are my notes for this consultation  If you have questions, please do not hesitate to call me  I look forward to following your patient along with you  Sincerely,        Rudi Gomez MD        CC: SHERRILL Padgett MD Twylla Scarpa, MD  4/30/2021 11:11 AM  Sign when Signing Visit     Surgical Oncology Follow Up       [address] [address]    Kiana Harrell  1940  [id number]  [facility]  [facility] SURGICAL ONCOLOGY [address] [address]    Chief Complaint   Patient presents with    Post-op       Assessment/Plan:    No problem-specific Assessment & Plan notes found for this encounter  Diagnoses and all orders for this visit:    Malignant melanoma of upper back ([address] )    Other orders  -     tamsulosin (FLOMAX) 0 4 mg  -     finasteride (PROSCAR) 5 mg tablet        Advance Care Planning/Advance Directives:  Discussed disease status, cancer treatment plans and/or cancer treatment goals with the patient  Oncology History   Malignant melanoma of upper back ([address] )   3/12/2021 Biopsy    Thoracic spine shave biopsy:  Melanoma 0 4mm  No mitosis  No ulceration     4/13/2021 Surgery    Left back melanoma excision with SLN:  - Inked margins with no tumor seen  - No tumor seen in one lymph node (0/1)         History of Present Illness: The patient is an 44-year-old man status post excision of left back melanoma, with sentinel node biopsy   -Interval History:  He is here for postop check  He had no postop issues with regards to his incision sites      Review of Systems:  Review of Systems   Skin: Positive for wound  Patient Active Problem List   Diagnosis    [facility]oma ([facility] )    Fall, accidental    Scalp laceration    Gout    Hypercholesterolemia    Tinnitus    Hearing loss    Malignant melanoma of upper back [facility])     Past Medical History:   Diagnosis Date    Cancer ([facility] )     melanoma right shoulder    Gout     Hyperlipidemia     Melanoma ([facility] )      Past Surgical History:   Procedure Laterality Date    COLONOSCOPY      KNEE SURGERY Bilateral     LYMPH NODE BIOPSY Left 4/13/2021    Procedure: BIOPSY LYMPH NODE SENTINEL neck;  Surgeon: Bre Massey MD;  [facility];  Service: Surgical Oncology    SKIN BIOPSY      R SHOULDER -MELANOMA    SKIN LESION EXCISION N/A 4/13/2021    Procedure: WIDE EXCISION MELANOMA MID UPPER BACK, lymphoscintography intraoperative lymphatic mapping,;  Surgeon: Bre Massey MD;  [facility];  Service: Surgical Oncology     Family History   Problem Relation Age of Onset    Hyperlipidemia Other      Social History     Socioeconomic History    Marital status: /Civil Union     Spouse name: Not on file    Number of children: Not on file    Years of education: Not on file    Highest education level: Not on file   Occupational History    Not on file   Social Needs    Financial resource strain: Not on file    Food insecurity     Worry: Not on file     Inability: Not on file   [employer] needs     Medical: Not on file     Non-medical: Not on file   Tobacco Use    Smoking status: Never Smoker    Smokeless tobacco: Never Used   Substance and Sexual Activity    Alcohol use:  Yes     Alcohol/week: 2 0 standard drinks     Types: 2 Cans of beer per week     Frequency: 2-3 times a week     Drinks per session: 1 or 2     Comment: occasional    Drug use: No    Sexual activity: Yes   Lifestyle    Physical activity     Days per week: Not on file     Minutes per session: Not on file    Stress: Not on file Relationships    Social connections     Talks on phone: Not on file     Gets together: Not on file     Attends Holiness service: Not on file     Active member of club or organization: Not on file     Attends meetings of clubs or organizations: Not on file     Relationship status: Not on file    Intimate partner violence     Fear of current or ex partner: Not on file     Emotionally abused: Not on file     Physically abused: Not on file     Forced sexual activity: Not on file   Other Topics Concern    Not on file   Social History Narrative    Not on file       Current Outpatient Medications:     allopurinol (ZYLOPRIM) 100 mg tablet, Take 100 mg by mouth daily  , Disp: , Rfl:     Cyanocobalamin (RA VITAMIN B-12) 1000 MCG/ML LIQD, Inject as directed, Disp: , Rfl:     finasteride (PROSCAR) 5 mg tablet, , Disp: , Rfl:     folic acid (FOLVITE) 1 mg tablet, Take by mouth, Disp: , Rfl:     lisinopril (ZESTRIL) 5 mg tablet, Take 5 mg by mouth daily , Disp: , Rfl:     Multiple Vitamin (multivitamin) tablet, Take 1 tablet by mouth daily, Disp: , Rfl:     Propylene Glycol (Systane Balance) 0 6 % SOLN, Apply to eye, Disp: , Rfl:     rosuvastatin (CRESTOR) 10 MG tablet, Take 10 mg by mouth daily  , Disp: , Rfl:     tamsulosin (FLOMAX) 0 4 mg, , Disp: , Rfl:     traMADol (ULTRAM) 50 mg tablet, Take 1 tablet (50 mg total) by mouth every 6 (six) hours as needed for moderate pain, Disp: 10 tablet, Rfl: 0  Allergies   Allergen Reactions    Horse-Derived Products      Annotation - [id number]: "horse serum"     Vitals:    04/30/21 1040   BP: 140/78   Pulse: 64   Resp: 18   Temp: 98 °F (36 7 °C)   SpO2: 98%       Physical Exam  Constitutional:       Appearance: Normal appearance  Skin:     Comments: Midback and left cervical incision is clean dry intact  Neurological:      Mental Status: He is alert             Results:  Labs:    Case Report   Surgical Pathology Report                         Case: [id number]                                    Authorizing Provider: Cristiane Banks MD        Collected:           04/13/2021 1818               Ordering Location:     [address]      Received:            04/14/2021 0746                                      [facility] Operating Room                                                       Pathologist:           Ulysses Roman MD                                                                   Specimens:   A) - Skin, Other, left back melanoma                                                                 B) - Lymph Node, Leroy, left neck sentinel lymph node                                   Final Diagnosis   A  Skin, left back, excision:  - Prior biopsy site reaction, no residual tumor seen  - Inked margins with no tumor seen        B   Left neck sentinel lymph node, biopsy:  - No tumor seen in one lymph node (0/1); see note      Note:  Immunostains for S100, HMB45 and Mart 1 do not reveal metastatic melanoma          Interpretation performed at [facility], [address] [place] [address], [address]       Electronically signed by Ulysses Roman MD on 4/21/2021 at  2:13 PM     Case Report   Surgical Pathology Report                         Case: [id number]                                    Authorizing Provider: Cristiane Banks MD        Collected:           04/05/2021 0845               Ordering Location:     [address]      Received:            04/05/2021 0845                                      [facility] Specialty                                                                                   Laboratory                                                                    Pathologist:           Gopal Adams MD                                                            Specimen:    Skin, Other, Superior thoracic spine ( 1 slide [id number] [facility],                      collected 3/12/2121)                                                                       Final Diagnosis   A  Skin, superior thoracic spine ( 1 slide [id number] [facility], collected 3/12/2121):     Consistent with MELANOMA (thickness: 0 4 mm) (see note)      Note: Please see synoptic report for additional details  Electronically signed by Kady Manning MD on 4/5/2021 at  9:58 AM   Additional Information    All reported additional testing was performed with appropriately reactive controls   These tests were developed and their performance characteristics determined by [facility] or appropriate performing facility, though some tests may be performed on tissues which have not been validated for performance characteristics (such as staining performed on alcohol exposed cell blocks and decalcified tissues)   Results should be interpreted with caution and in the context of the patients clinical condition  These tests may not be cleared or approved by the [place]  Food and Drug Administration, though the FDA has determined that such clearance or approval is not necessary  These tests are used for clinical purposes and they should not be regarded as investigational or for research  This laboratory has been approved by [place] [address], designated as a high-complexity laboratory and is qualified to perform these tests        Synoptic Checklist   MELANOMA OF THE SKIN: Biopsy  MELANOMA OF THE SKIN: BIOPSY - All Specimens  8th Edition - Protocol posted: 4/29/2020  SPECIMEN   Procedure  Biopsy, shave    Specimen Laterality  Not specified    TUMOR   Tumor Site  Skin of trunk: superior thoracic spine         Histologic Type  Superficial spreading melanoma (low-cumulative sun damage (CSD) melanoma)    Maximum Tumor (Breslow) Thickness (Millimeters)  0 4 mm   Ulceration  Cannot be determined: Present (possibly due to trauma/excoriation)    Rohit Norris Level  III (melanoma fills and expands papillary dermis)    Mitotic Rate  None identified    Microsatellite(s)  Not identified Lymphovascular Invasion  Not identified    Neurotropism  Not identified    Tumor Regression  Not identified    MARGINS     Peripheral Margins  Negative for invasive melanoma    Status of Melanoma in situ at Peripheral Margins  Cannot be assessed: Approaches peripheral specimen margin    Deep Margin  Negative for invasive melanoma    PATHOLOGIC STAGE CLASSIFICATION (pTNM, AJCC 8th Edition)     Primary Tumor (pT)  pT1a           Imaging  Xr Chest Pa & Lateral    Result Date: 4/3/2021  Narrative: CHEST INDICATION:   C43 59: Malignant melanoma of other part of trunk  COMPARISON:  Chest radiograph from 10/4/2013  EXAM PERFORMED/VIEWS:  XR CHEST PA & LATERAL  DUAL ENERGY SUBTRACTION  FINDINGS: Cardiomediastinal silhouette normal  Lungs clear  No effusion or pneumothorax  Moderate degenerative disease in the spine  Impression: No acute cardiopulmonary disease  Workstation performed: [id number]     Nm Lymphatic Melanoma    Result Date: 4/13/2021  Narrative: SENTINEL NODE LYMPHOSCINTIGRAPHY INDICATION:   Mid back melanoma  FINDINGS: 0 512 mCi Tc-99m sulfur colloid (0 6 cc volume) was administered intradermally in divided doses by Dr Carlos Avalos in the region of the patients MID upper back melanoma  Scintigraphic images were obtained over the left supraclavicular soft tissues in multiple projections  Using scintigraphic guidance, the corresponding skin site was marked with an indelible marker  The patient was transferred to the operating room in satisfactory condition  Impression: 1  [place] lymph node localized to the left supraclavicular soft tissues  Workstation performed: [id number]     I reviewed the above laboratory and imaging data  Discussion/Summary:  History of stage I melanoma back, with negative sentinel node  I recommended every 3 6 month surveillance visit  He lives at least an [place] of here  He already sees Dr Nadya Saldana  [place] home    He will therefore plan on seeing him on a regular basis   I will therefore see him as needed

## 2022-04-01 ENCOUNTER — APPOINTMENT (EMERGENCY)
Dept: CT IMAGING | Facility: HOSPITAL | Age: 82
End: 2022-04-01
Payer: MEDICARE

## 2022-04-01 ENCOUNTER — HOSPITAL ENCOUNTER (EMERGENCY)
Facility: HOSPITAL | Age: 82
Discharge: HOME | End: 2022-04-01
Attending: EMERGENCY MEDICINE
Payer: MEDICARE

## 2022-04-01 VITALS
HEART RATE: 65 BPM | OXYGEN SATURATION: 98 % | SYSTOLIC BLOOD PRESSURE: 189 MMHG | RESPIRATION RATE: 18 BRPM | WEIGHT: 200 LBS | DIASTOLIC BLOOD PRESSURE: 87 MMHG | TEMPERATURE: 97.6 F | BODY MASS INDEX: 27.12 KG/M2

## 2022-04-01 DIAGNOSIS — S01.21XA LACERATION OF NOSE: Primary | ICD-10-CM

## 2022-04-01 PROCEDURE — 70450 CT HEAD/BRAIN W/O DYE: CPT

## 2022-04-01 PROCEDURE — 12011 RPR F/E/E/N/L/M 2.5 CM/<: CPT | Performed by: EMERGENCY MEDICINE

## 2022-04-01 PROCEDURE — 99284 EMERGENCY DEPT VISIT MOD MDM: CPT

## 2022-04-01 PROCEDURE — 99282 EMERGENCY DEPT VISIT SF MDM: CPT | Performed by: EMERGENCY MEDICINE

## 2022-04-01 RX ORDER — LIDOCAINE HYDROCHLORIDE AND EPINEPHRINE 10; 10 MG/ML; UG/ML
10 INJECTION, SOLUTION INFILTRATION; PERINEURAL ONCE
Status: DISCONTINUED | OUTPATIENT
Start: 2022-04-01 | End: 2022-04-01 | Stop reason: HOSPADM

## 2022-04-01 NOTE — DISCHARGE INSTRUCTIONS
Please seek medical attention if experience signs of infection to your infection including not limited to: increased redness, increased pain, foul smelling discharge, fevers/chill  You may return to the ER, or visit your PCP or Urgent Care to have the stitches removed in 5-7 days

## 2022-04-01 NOTE — ED PROVIDER NOTES
History  Chief Complaint   Patient presents with    Trauma    Fall     1 hour ago the patient was walking and tripped on the side walk and fell to the ground  The patient stated he hit his nose off of the pavement and his glasses scratched his nose  Denies altered loc, pain, or thinners  76-year-old male presents for evaluation after fall with head strike  Fall happened about an hour ago  Patient reports he tripped off a curb and fell forward striking his head  Patient's glasses of into the bridge of his nose and he has a laceration without active bleeding  Patient denies loss of consciousness with the fall, he was able to pick himself off the ground and get to the car where his wife was waiting  Patient did tried to brace himself, he has superficial abrasions to bilateral palms, denies any extremity pain  He further denies headache, vision changes, neck or back pain  Patient's last tetanus was updated in 2016  He denies use of aspirin or other blood thinning medications  Prior to Admission Medications   Prescriptions Last Dose Informant Patient Reported? Taking? Cyanocobalamin (RA VITAMIN B-12) 1000 MCG/ML LIQD  Self Yes No   Sig: Inject as directed   Multiple Vitamin (multivitamin) tablet  Self Yes No   Sig: Take 1 tablet by mouth daily   Propylene Glycol (Systane Balance) 0 6 % SOLN  Self Yes No   Sig: Apply to eye   allopurinol (ZYLOPRIM) 100 mg tablet  Self Yes No   Sig: Take 100 mg by mouth daily  finasteride (PROSCAR) 5 mg tablet   Yes No   folic acid (FOLVITE) 1 mg tablet  Self Yes No   Sig: Take by mouth   lisinopril (ZESTRIL) 5 mg tablet  Self Yes No   Sig: Take 5 mg by mouth daily    rosuvastatin (CRESTOR) 10 MG tablet  Self Yes No   Sig: Take 10 mg by mouth daily     tamsulosin (FLOMAX) 0 4 mg   Yes No   traMADol (ULTRAM) 50 mg tablet   No No   Sig: Take 1 tablet (50 mg total) by mouth every 6 (six) hours as needed for moderate pain      Facility-Administered Medications: None Past Medical History:   Diagnosis Date    Cancer [facility])     melanoma right shoulder    Gout     Hyperlipidemia     Melanoma ([facility] [address] )        Past Surgical History:   Procedure Laterality Date    COLONOSCOPY      KNEE SURGERY Bilateral     LYMPH NODE BIOPSY Left 4/13/2021    Procedure: BIOPSY LYMPH NODE SENTINEL neck;  Surgeon: Francis Montez MD;  Location: [facility];  Service: Surgical Oncology    SKIN BIOPSY      N IMZEKJOD -MELANOMA    SKIN LESION EXCISION N/A 4/13/2021    Procedure: WIDE EXCISION MELANOMA MID UPPER BACK, lymphoscintography intraoperative lymphatic mapping,;  Surgeon: Francis Montez MD;  Location: [facility];  Service: Surgical Oncology       Family History   Problem Relation Age of Onset    Hyperlipidemia Other      I have reviewed and agree with the history as documented  E-Cigarette/Vaping    E-Cigarette Use Never User      E-Cigarette/Vaping Substances    Nicotine No     THC No     CBD No     Flavoring No     Other No     Unknown No      Social History     Tobacco Use    Smoking status: Never Smoker    Smokeless tobacco: Never Used   Vaping Use    Vaping Use: Never used   Substance Use Topics    Alcohol use: Yes     Alcohol/week: 2 0 standard drinks     Types: 2 Cans of beer per week     Comment: occasional    Drug use: No       Review of Systems   Eyes: Negative for visual disturbance  Respiratory: Negative for shortness of breath  Cardiovascular: Negative for chest pain  Gastrointestinal: Negative for abdominal pain and vomiting  Musculoskeletal: Negative for arthralgias, back pain, gait problem and neck pain  Skin: Positive for wound  Neurological: Negative for weakness, numbness and headaches  All other systems reviewed and are negative  Physical Exam  Physical Exam  Vitals reviewed  Constitutional:       General: He is not in acute distress  Appearance: Normal appearance  He is not ill-appearing, toxic-appearing or diaphoretic  HENT:      Head: Normocephalic  Comments: About 1 5 cm gaping laceration to bridge of nose     Right Ear: Tympanic membrane, ear canal and external ear normal       Left Ear: Tympanic membrane, ear canal and external ear normal       Nose: Nose normal       Comments: No nasal septal hematoma  Eyes:      General:         Right eye: No discharge  Left eye: No discharge  Extraocular Movements: Extraocular movements intact  Cardiovascular:      Rate and Rhythm: Normal rate and regular rhythm  Pulmonary:      Effort: Pulmonary effort is normal       Breath sounds: Normal breath sounds  Chest:      Chest wall: No tenderness  Abdominal:      General: There is no distension  Palpations: Abdomen is soft  Tenderness: There is no abdominal tenderness  There is no guarding or rebound  Musculoskeletal:         General: No swelling, tenderness, deformity or signs of injury  Comments: No midline C/T/L-spine tenderness   Skin:     General: Skin is warm  Coloration: Skin is not jaundiced or pale  Comments: Superficial abrasions to bilateral palms, left worse than right   Neurological:      General: No focal deficit present  Mental Status: He is alert  Mental status is at baseline               Vital Signs  ED Triage Vitals [04/01/22 1223]   Temperature Pulse Respirations Blood Pressure SpO2   97 6 °F (36 4 °C) 65 18 (!) 189/87 98 %      Temp Source Heart Rate Source Patient Position - Orthostatic VS BP Location FiO2 (%)   Temporal Monitor Lying Right arm --      Pain Score       No Pain           Vitals:    04/01/22 1223 04/01/22 1225   BP: (!) 189/87    Pulse: 65    Patient Position - Orthostatic VS: Lying Lying         Visual Acuity  Visual Acuity      Most Recent Value   L Pupil Size (mm) 3   R Pupil Size (mm) 3          ED Medications  Medications   lidocaine-epinephrine (XYLOCAINE/EPINEPHRINE) 1 %-1:100,000 injection 10 mL (has no administration in time range) Diagnostic Studies  Results Reviewed     None                 CT head without contrast   Final Result by Poornima Boothe MD (04/01 9325)      No acute intracranial abnormality  Workstation performed: [id number]                    Procedures  Laceration repair    Date/Time: 4/1/2022 3:20 PM  Performed by: Akua Solomon DO  Authorized by: Akua Solomon DO   Consent: Verbal consent obtained  Risks and benefits: risks, benefits and alternatives were discussed  Consent given by: patient  Required items: required blood products, implants, devices, and special equipment available  Patient identity confirmed: verbally with patient  Body area: head/neck  Location details: nose  Laceration length: 1 5 cm  Foreign bodies: no foreign bodies  Tendon involvement: none  Nerve involvement: none  Anesthesia: local infiltration    Anesthesia:  Local Anesthetic: lidocaine 1% with epinephrine  Anesthetic total: 2 mL    Sedation:  Patient sedated: no      Wound Dehiscence:  Superficial Wound Dehiscence: simple closure      Procedure Details:  Preparation: Patient was prepped and draped in the usual sterile fashion  Irrigation solution: saline  Irrigation method: syringe  Amount of cleaning: standard  Debridement: none  Degree of undermining: minimal  Skin closure: Ethilon (5-0 ethilon)  Mucous membrane closure: 4-0 Chromic gut  Number of sutures: 5 (2 buried sutures with chromic, 3 external with ethilon)  Technique: buried suture and simple  Approximation: close  Approximation difficulty: simple  Patient tolerance: patient tolerated the procedure well with no immediate complications               ED Course  ED Course as of 04/01/22 1716   Fri Apr 01, 2022   1425 Updated to results, will attempt to repair laceration  Wife asking about BP readings, pt is presently asymptomatic and with fluctuating pressures  Advised to f/u with PCP                                 SBIRT 20yo+      Most Recent Value SBIRT (25 yo +)    In order to provide better care to our patients, we are screening all of our patients for alcohol and drug use  Would it be okay to ask you these screening questions? Yes Filed at: 04/01/2022 1300   Initial Alcohol Screen: US AUDIT-C     1  How often do you have a drink containing alcohol? 1 Filed at: 04/01/2022 1300   2  How many drinks containing alcohol do you have on a typical day you are drinking? 0 Filed at: 04/01/2022 1300   3a  Male UNDER 65: How often do you have five or more drinks on one occasion? 0 Filed at: 04/01/2022 1300   3b  FEMALE Any Age, or MALE 65+: How often do you have 4 or more drinks on one occassion? 0 Filed at: 04/01/2022 1300   Audit-C Score 1 Filed at: 04/01/2022 1300   BRANDT: How many times in the past year have you    Used an illegal drug or used a prescription medication for non-medical reasons? Never Filed at: 04/01/2022 1300                    MDM  Number of Diagnoses or Management Options  Laceration of nose  Diagnosis management comments: 58-year-old male presents for evaluation after fall with head strike  Will obtain CT head, attempt laceration repair  Patient's tetanus is up-to-date  Disposition  Final diagnoses:   Laceration of nose     Time reflects when diagnosis was documented in both MDM as applicable and the Disposition within this note     Time User Action Codes Description Comment    4/1/2022  3:03 PM Priscilla Thomas Add [S01 [id number]] Laceration of nose       ED Disposition     ED Disposition Condition Date/Time Comment    Discharge Stable Fri Apr 1, 2022  3:03 PM Linda Martínez discharge to home/self care              Follow-up Information     Follow up With Specialties Details Why [address] [place], [place] Nurse Practitioner   [address] [place] [address] [facility]  [phone]            Discharge Medication List as of 4/1/2022  3:05 PM      CONTINUE these medications which have NOT CHANGED    Details   allopurinol (ZYLOPRIM) 100 mg tablet Take 100 mg by mouth daily  , Historical Med      Cyanocobalamin (RA VITAMIN B-12) 1000 MCG/ML LIQD Inject as directed, Historical Med      finasteride (PROSCAR) 5 mg tablet Starting Thu 4/8/2021, Historical Med      folic acid (FOLVITE) 1 mg tablet Take by mouth, Historical Med      lisinopril (ZESTRIL) 5 mg tablet Take 5 mg by mouth daily , Starting Wed 3/10/2021, Historical Med      Multiple Vitamin (multivitamin) tablet Take 1 tablet by mouth daily, Historical Med      Propylene Glycol (Systane Balance) 0 6 % SOLN Apply to eye, Historical Med      rosuvastatin (CRESTOR) 10 MG tablet Take 10 mg by mouth daily  , Historical Med      tamsulosin (FLOMAX) 0 4 mg Starting Thu 4/8/2021, Historical Med      traMADol (ULTRAM) 50 mg tablet Take 1 tablet (50 mg total) by mouth every 6 (six) hours as needed for moderate pain, Starting Fri 4/16/2021, Normal             No discharge procedures on file      PDMP Review       Value Time User    PDMP Reviewed  Yes 4/16/2021  5:09 PM Neo Issa MD          ED Provider  Electronically Signed by           Agus Tate DO  04/01/22 9998

## 2024-07-25 ENCOUNTER — HOSPITAL ENCOUNTER (EMERGENCY)
Facility: HOSPITAL | Age: 84
Discharge: HOME | End: 2024-07-25
Attending: EMERGENCY MEDICINE
Payer: MEDICARE

## 2024-07-25 ENCOUNTER — APPOINTMENT (EMERGENCY)
Dept: CT IMAGING | Facility: HOSPITAL | Age: 84
End: 2024-07-25
Payer: MEDICARE

## 2024-07-25 ENCOUNTER — APPOINTMENT (EMERGENCY)
Dept: RADIOLOGY | Facility: HOSPITAL | Age: 84
End: 2024-07-25
Payer: MEDICARE

## 2024-07-25 VITALS
DIASTOLIC BLOOD PRESSURE: 63 MMHG | OXYGEN SATURATION: 93 % | WEIGHT: 203.48 LBS | HEART RATE: 68 BPM | SYSTOLIC BLOOD PRESSURE: 134 MMHG | RESPIRATION RATE: 18 BRPM | TEMPERATURE: 100.7 F | BODY MASS INDEX: 27.6 KG/M2

## 2024-07-25 DIAGNOSIS — U07.1 SARS-COV-2 POSITIVE: ICD-10-CM
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
## 2024-07-25 LAB
ALBUMIN SERPL BCG-MCNC: 4 G/DL (ref 3.5–5)
ALP SERPL-CCNC: 49 U/L (ref 34–104)
ALT SERPL W P-5'-P-CCNC: 18 U/L (ref 7–52)
ANION GAP SERPL CALCULATED.3IONS-SCNC: 8 MMOL/L (ref 4–13)
AST SERPL W P-5'-P-CCNC: 21 U/L (ref 13–39)
BASOPHILS # BLD AUTO: 0.04 THOUSANDS/ÂΜL (ref 0–0.1)
BASOPHILS NFR BLD AUTO: 1 % (ref 0–1)
BILIRUB SERPL-MCNC: 0.97 MG/DL (ref 0.2–1)
BILIRUB UR QL STRIP: NEGATIVE
BUN SERPL-MCNC: 16 MG/DL (ref 5–25)
CALCIUM SERPL-MCNC: 9.1 MG/DL (ref 8.4–10.2)
CHLORIDE SERPL-SCNC: 101 MMOL/L (ref 96–108)
CLARITY UR: CLEAR
CO2 SERPL-SCNC: 27 MMOL/L (ref 21–32)
COLOR UR: YELLOW
CREAT SERPL-MCNC: 1.29 MG/DL (ref 0.6–1.3)
EOSINOPHIL # BLD AUTO: 0.03 THOUSAND/ÂΜL (ref 0–0.61)
EOSINOPHIL NFR BLD AUTO: 0 % (ref 0–6)
ERYTHROCYTE [DISTWIDTH] IN BLOOD BY AUTOMATED COUNT: 12.3 % (ref 11.6–15.1)
FLUAV RNA RESP QL NAA+PROBE: NEGATIVE
FLUBV RNA RESP QL NAA+PROBE: NEGATIVE
GFR SERPL CREATININE-BSD FRML MDRD: 50 ML/MIN/1.73SQ M
GLUCOSE SERPL-MCNC: 81 MG/DL (ref 65–140)
GLUCOSE UR STRIP-MCNC: NEGATIVE MG/DL
HCT VFR BLD AUTO: 40 % (ref 36.5–49.3)
HGB BLD-MCNC: 13.3 G/DL (ref 12–17)
HGB UR QL STRIP.AUTO: NEGATIVE
IMM GRANULOCYTES # BLD AUTO: 0.02 THOUSAND/UL (ref 0–0.2)
IMM GRANULOCYTES NFR BLD AUTO: 0 % (ref 0–2)
KETONES UR STRIP-MCNC: NEGATIVE MG/DL
LEUKOCYTE ESTERASE UR QL STRIP: NEGATIVE
LYMPHOCYTES # BLD AUTO: 0.57 THOUSANDS/ÂΜL (ref 0.6–4.47)
LYMPHOCYTES NFR BLD AUTO: 7 % (ref 14–44)
MCH RBC QN AUTO: 32.8 PG (ref 26.8–34.3)
MCHC RBC AUTO-ENTMCNC: 33.3 G/DL (ref 31.4–37.4)
MCV RBC AUTO: 99 FL (ref 82–98)
MONOCYTES # BLD AUTO: 0.78 THOUSAND/ÂΜL (ref 0.17–1.22)
MONOCYTES NFR BLD AUTO: 10 % (ref 4–12)
NEUTROPHILS # BLD AUTO: 6.24 THOUSANDS/ÂΜL (ref 1.85–7.62)
NEUTS SEG NFR BLD AUTO: 82 % (ref 43–75)
NITRITE UR QL STRIP: NEGATIVE
NRBC BLD AUTO-RTO: 0 /100 WBCS
PH UR STRIP.AUTO: 5.5 [PH]
PLATELET # BLD AUTO: 176 THOUSANDS/UL (ref 149–390)
PMV BLD AUTO: 10.8 FL (ref 8.9–12.7)
POTASSIUM SERPL-SCNC: 4.5 MMOL/L (ref 3.5–5.3)
PROT SERPL-MCNC: 6.6 G/DL (ref 6.4–8.4)
PROT UR STRIP-MCNC: NEGATIVE MG/DL
RBC # BLD AUTO: 4.05 MILLION/UL (ref 3.88–5.62)
RSV RNA RESP QL NAA+PROBE: NEGATIVE
SARS-COV-2 RNA RESP QL NAA+PROBE: POSITIVE
SODIUM SERPL-SCNC: 136 MMOL/L (ref 135–147)
SP GR UR STRIP.AUTO: 1.01 (ref 1–1.03)
UROBILINOGEN UR STRIP-ACNC: <2 MG/DL
WBC # BLD AUTO: 7.68 THOUSAND/UL (ref 4.31–10.16)

## 2024-07-25 PROCEDURE — 85025 COMPLETE CBC W/AUTO DIFF WBC: CPT

## 2024-07-25 PROCEDURE — 80053 COMPREHEN METABOLIC PANEL: CPT

## 2024-07-25 PROCEDURE — 0241U HB NFCT DS VIR RESP RNA 4 TRGT: CPT

## 2024-07-25 PROCEDURE — 81003 URINALYSIS AUTO W/O SCOPE: CPT

## 2024-07-25 PROCEDURE — 99285 EMERGENCY DEPT VISIT HI MDM: CPT | Performed by: EMERGENCY MEDICINE

## 2024-07-25 PROCEDURE — 71046 X-RAY EXAM CHEST 2 VIEWS: CPT

## 2024-07-25 PROCEDURE — 93005 ELECTROCARDIOGRAM TRACING: CPT

## 2024-07-25 PROCEDURE — 36415 COLL VENOUS BLD VENIPUNCTURE: CPT

## 2024-07-25 PROCEDURE — 99284 EMERGENCY DEPT VISIT MOD MDM: CPT

## 2024-07-25 PROCEDURE — 70450 CT HEAD/BRAIN W/O DYE: CPT

## 2024-07-25 RX ORDER — ACETAMINOPHEN 325 MG/1
975 TABLET ORAL ONCE
Status: COMPLETED | OUTPATIENT
Start: 2024-07-25 | End: 2024-07-25

## 2024-07-25 RX ADMIN — ACETAMINOPHEN 975 MG: 325 TABLET, FILM COATED ORAL at 11:40

## 2024-07-25 NOTE — DISCHARGE INSTRUCTIONS
Mr. Infante,    Your blood work and urinalysis look good.  Your chest x-ray did not show any obvious pneumonia, and your head CT showed no acute bleeding.  You are positive for COVID, but it seems like your only symptom currently is fever.  Please follow-up with your primary care doctor in about a week to recheck. I like to advise vitamin C, vitamin D, and zinc during infection. And drink plenty of fluids! If you develop mild symptoms (cough, sneezing, etc.), you can use over-the-counter meds, cough drops, and warm fluids (soup, tea, etc.). Please return to the ED if you develop any acute/concerning symptoms.    Sincerely,    Thomas Russo, DO

## 2024-07-25 NOTE — ED PROVIDER NOTES
History  Chief Complaint   Patient presents with    Fall     Pt was pushing garbage can outside and fell on wet grass. No LOC or blood thinners noted     Patient presented for chief concern of fall with head-strike this morning. He is on no blood-thinners and denies loss of consciousness. He says that he was pushing a trash-can and it slipped forward d/t ground moisture, and he fell in the grass. He denies dizziness, lightheadedness, weakness, paresthesia, etc. (See ROS below)        Prior to Admission Medications   Prescriptions Last Dose Informant Patient Reported? Taking?   Cyanocobalamin (RA VITAMIN B-12) 1000 MCG/ML LIQD  Self Yes No   Sig: Inject as directed   Multiple Vitamin (multivitamin) tablet  Self Yes No   Sig: Take 1 tablet by mouth daily   Propylene Glycol (Systane Balance) 0.6 % SOLN  Self Yes No   Sig: Apply to eye   allopurinol (ZYLOPRIM) 100 mg tablet  Self Yes No   Sig: Take 100 mg by mouth daily.   finasteride (PROSCAR) 5 mg tablet   Yes No   folic acid (FOLVITE) 1 mg tablet  Self Yes No   Sig: Take by mouth   lisinopril (ZESTRIL) 5 mg tablet  Self Yes No   Sig: Take 5 mg by mouth daily    rosuvastatin (CRESTOR) 10 MG tablet  Self Yes No   Sig: Take 10 mg by mouth daily.   tamsulosin (FLOMAX) 0.4 mg   Yes No   traMADol (ULTRAM) 50 mg tablet   No No   Sig: Take 1 tablet (50 mg total) by mouth every 6 (six) hours as needed for moderate pain      Facility-Administered Medications: None       Past Medical History:   Diagnosis Date    Cancer (HCC)     melanoma right shoulder    Gout     Hyperlipidemia     Melanoma (HCC)        Past Surgical History:   Procedure Laterality Date    COLONOSCOPY      KNEE SURGERY Bilateral     LYMPH NODE BIOPSY Left 4/13/2021    Procedure: BIOPSY LYMPH NODE SENTINEL neck;  Surgeon: William Austin MD;  Location: [facility];  Service: Surgical Oncology    SKIN BIOPSY      R.SHOULDER -MELANOMA    SKIN LESION EXCISION N/A 4/13/2021    Procedure: WIDE EXCISION MELANOMA MID  UPPER BACK, lymphoscintography intraoperative lymphatic mapping,;  Surgeon: William Austin MD;  Location: [facility];  Service: Surgical Oncology       Family History   Problem Relation Age of Onset    Hyperlipidemia Other      I have reviewed and agree with the history as documented.    E-Cigarette/Vaping    E-Cigarette Use Never User      E-Cigarette/Vaping Substances    Nicotine No     THC No     CBD No     Flavoring No     Other No     Unknown No      Social History     Tobacco Use    Smoking status: Never    Smokeless tobacco: Never   Vaping Use    Vaping status: Never Used   Substance Use Topics    Alcohol use: Yes     Alcohol/week: 2.0 standard drinks of alcohol     Types: 2 Cans of beer per week     Comment: occasional    Drug use: No        Review of Systems   Constitutional:  Negative for chills and fever.   HENT:  Negative for congestion and sore throat.    Respiratory:  Negative for cough, shortness of breath and wheezing.    Cardiovascular:  Negative for chest pain and palpitations.   Gastrointestinal:  Negative for abdominal pain, constipation, diarrhea and nausea.   Genitourinary:  Negative for difficulty urinating, dysuria, frequency and hematuria.   Neurological:  Negative for dizziness, syncope, weakness, light-headedness and numbness.   All other systems reviewed and are negative.      Physical Exam  ED Triage Vitals [07/25/24 1012]   Temperature Pulse Respirations Blood Pressure SpO2   (!) 100.7 °F (38.2 °C) 71 18 170/77 97 %      Temp Source Heart Rate Source Patient Position - Orthostatic VS BP Location FiO2 (%)   Temporal Monitor Sitting Left arm --      Pain Score       No Pain             Orthostatic Vital Signs  Vitals:    07/25/24 1012 07/25/24 1200   BP: 170/77 134/63   Pulse: 71 68   Patient Position - Orthostatic VS: Sitting Sitting       Physical Exam  Vitals and nursing note reviewed.   Constitutional:       General: He is not in acute distress.     Appearance: Normal appearance. He  is normal weight. He is not ill-appearing.   HENT:      Head: Normocephalic.      Comments: Abrasion just lateral to left eye, not bleeding  Cardiovascular:      Rate and Rhythm: Normal rate and regular rhythm.      Heart sounds: Normal heart sounds. No murmur heard.  Pulmonary:      Effort: Pulmonary effort is normal.      Breath sounds: Normal breath sounds. No wheezing, rhonchi or rales.   Abdominal:      General: Abdomen is flat. Bowel sounds are normal. There is no distension.      Palpations: Abdomen is soft.      Tenderness: There is no abdominal tenderness. There is no right CVA tenderness, left CVA tenderness or guarding.   Musculoskeletal:      Right lower leg: No edema.      Left lower leg: No edema.   Neurological:      General: No focal deficit present.      Mental Status: He is alert.      Cranial Nerves: No cranial nerve deficit.      Sensory: No sensory deficit.      Motor: No weakness.         ED Medications  Medications   acetaminophen (TYLENOL) tablet 975 mg (975 mg Oral Given 7/25/24 1140)       Diagnostic Studies  Results Reviewed       Procedure Component Value Units Date/Time    UA w Reflex to Microscopic w Reflex to Culture [[id number]] Collected: 07/25/24 1136    Lab Status: Final result Specimen: Urine, Clean Catch Updated: 07/25/24 1143     Color, UA Yellow     Clarity, UA Clear     Specific Gravity, UA 1.015     pH, UA 5.5     Leukocytes, UA Negative     Nitrite, UA Negative     Protein, UA Negative mg/dl      Glucose, UA Negative mg/dl      Ketones, UA Negative mg/dl      Urobilinogen, UA <2.0 mg/dl      Bilirubin, UA Negative     Occult Blood, UA Negative    FLU/RSV/COVID - if FLU/RSV clinically relevant [[id number]]  (Abnormal) Collected: 07/25/24 1049    Lab Status: Final result Specimen: Nares from Nose Updated: 07/25/24 1136     SARS-CoV-2 Positive     INFLUENZA A PCR Negative     INFLUENZA B PCR Negative     RSV PCR Negative    Narrative:      FOR PEDIATRIC PATIENTS - copy/paste  COVID Guidelines URL to browser: [email or web address]    SARS-CoV-2 assay is a Nucleic Acid Amplification assay intended for the  qualitative detection of nucleic acid from SARS-CoV-2 in nasopharyngeal  swabs. Results are for the presumptive identification of SARS-CoV-2 RNA.    Positive results are indicative of infection with SARS-CoV-2, the virus  causing COVID-19, but do not rule out bacterial infection or co-infection  with other viruses. Laboratories within the [place] and its  territories are required to report all positive results to the appropriate  public health authorities. Negative results do not preclude SARS-CoV-2  infection and should not be used as the sole basis for treatment or other  patient management decisions. Negative results must be combined with  clinical observations, patient history, and epidemiological information.  This test has not been FDA cleared or approved.    This test has been authorized by FDA under an Emergency Use Authorization  (EUA). This test is only authorized for the duration of time the  declaration that circumstances exist justifying the authorization of the  emergency use of an in vitro diagnostic tests for detection of SARS-CoV-2  virus and/or diagnosis of COVID-19 infection under section 564(b)(1) of  the Act, 21 U.S.C. 360bbb-3(b)(1), unless the authorization is terminated  or revoked sooner. The test has been validated but independent review by FDA  and CLIA is pending.    Test performed using [employer] Gene[employer]pert: This RT-PCR assay targets N2,  a region unique to SARS-CoV-2. A conserved region in the E-gene was chosen  for pan-Sarbecovirus detection which includes SARS-CoV-2.    According to CMS-2020-01-R, this platform meets the definition of high-throughput technology.    Comprehensive metabolic panel [[id number]] Collected: 07/25/24 1049    Lab Status: Final result Specimen: Blood from Arm, Right Updated:  07/25/24 1116     Sodium 136 mmol/L      Potassium 4.5 mmol/L      Chloride 101 mmol/L      CO2 27 mmol/L      ANION GAP 8 mmol/L      BUN 16 mg/dL      Creatinine 1.29 mg/dL      Glucose 81 mg/dL      Calcium 9.1 mg/dL      AST 21 U/L      ALT 18 U/L      Alkaline Phosphatase 49 U/L      Total Protein 6.6 g/dL      Albumin 4.0 g/dL      Total Bilirubin 0.97 mg/dL      eGFR 50 ml/min/1.73sq m     Narrative:      National Kidney Disease Foundation guidelines for Chronic Kidney Disease (CKD):     Stage 1 with normal or high GFR (GFR > 90 mL/min/1.73 square meters)    Stage 2 Mild CKD (GFR = 60-89 mL/min/1.73 square meters)    Stage 3A Moderate CKD (GFR = 45-59 mL/min/1.73 square meters)    Stage 3B Moderate CKD (GFR = 30-44 mL/min/1.73 square meters)    Stage 4 Severe CKD (GFR = 15-29 mL/min/1.73 square meters)    Stage 5 End Stage CKD (GFR <15 mL/min/1.73 square meters)  Note: GFR calculation is accurate only with a steady state creatinine    CBC and differential [[id number]]  (Abnormal) Collected: 07/25/24 1049    Lab Status: Final result Specimen: Blood from Arm, Right Updated: 07/25/24 1056     WBC 7.68 Thousand/uL      RBC 4.05 Million/uL      Hemoglobin 13.3 g/dL      Hematocrit 40.0 %      MCV 99 fL      MCH 32.8 pg      MCHC 33.3 g/dL      RDW 12.3 %      MPV 10.8 fL      Platelets 176 Thousands/uL      nRBC 0 /100 WBCs      Segmented % 82 %      Immature Grans % 0 %      Lymphocytes % 7 %      Monocytes % 10 %      Eosinophils Relative 0 %      Basophils Relative 1 %      Absolute Neutrophils 6.24 Thousands/µL      Absolute Immature Grans 0.02 Thousand/uL      Absolute Lymphocytes 0.57 Thousands/µL      Absolute Monocytes 0.78 Thousand/µL      Eosinophils Absolute 0.03 Thousand/µL      Basophils Absolute 0.04 Thousands/µL                    XR chest 2 views   ED Interpretation by Octavio Cuenca DO (07/25 1128)   Airway is midline. Lungs are clear bilaterally with no evidence of pulmonary vascular  congestion/focal infiltrate/pleural effusion/pneumothorax. Cardiac and mediastinal silhouettes are within normal limits. Osseous structures appear normal.        Final Result by Erik Cuadra MD (07/25 1327)      No acute cardiopulmonary disease.            Workstation performed: [id number]         CT head without contrast   Final Result by Zuleima Awad MD (07/25 1202)      No acute intracranial CT abnormality.                  Workstation performed: [id number]                   ED Course     Sent patient for noncontrast head CT to rule out acute intracranial bleed. Also basic labs (CBC, CMP, UA, viral swab) and CXR to investigate for sources of infection, since he was febrile on presentation. Serum labs were unremarkable, as was CXR, but swab was positive for SARS-CoV-2. Patient says that he got all the COVID-19 vaccines. He's not interested in Paxlovid since he's basically asymptomatic. Head CT showed no acute intracranial abnormality. Patient was deemed stable for discharge home with conservative treatment of COVID-19.        SBIRT 22yo+      Flowsheet Row Most Recent Value   Initial Alcohol Screen: US AUDIT-C     1. How often do you have a drink containing alcohol? 0 Filed at: 07/25/2024 1012   2. How many drinks containing alcohol do you have on a typical day you are drinking?  0 Filed at: 07/25/2024 1012   3a. Male UNDER 65: How often do you have five or more drinks on one occasion? 0 Filed at: 07/25/2024 1012   Audit-C Score 0 Filed at: 07/25/2024 1012   BRANDT: How many times in the past year have you...    Used an illegal drug or used a prescription medication for non-medical reasons? Never Filed at: 07/25/2024 1012                  Medical Decision Making  Amount and/or Complexity of Data Reviewed  Independent Historian: spouse  Labs: ordered.     Details: Bloodwork unremarkable.  UA: WNL  Viral swab positive for COVID-19.  Radiology: ordered and independent interpretation performed.      Details: CXR: no acute cardiopulmonary abnormality  CT head w/o contrast: no acute intracranial abnormality  ECG/medicine tests: ordered.     Details: NSR  Discussion of management or test interpretation with external provider(s): Patient is not interested in Paxlovid.    Risk  OTC drugs.          Disposition  Final diagnoses:   SARS-CoV-2 positive   Fall, initial encounter - Headstrike w/o LOC or bloodthinners     Time reflects when diagnosis was documented in both MDM as applicable and the Disposition within this note       Time User Action Codes Description Comment    7/25/2024 12:11 PM Thomas Russo Add [U07.1] SARS-CoV-2 positive     7/25/2024 12:11 PM Thomas Russo Add [W19.XXXA] Fall, initial encounter     7/25/2024 12:11 PM Thomas Russo Modify [U07.1] SARS-CoV-2 positive     7/25/2024 12:11 PM Thomas Russo Modify [W19.XXXA] Fall, initial encounter     7/25/2024 12:11 PM Thomas Russo Modify [W19.XXXA] Fall, initial encounter Headstrike w/o LOC or bloodthinners          ED Disposition       ED Disposition   Discharge    Condition   Stable    Date/Time   Thu Jul 25, 2024 1202    Comment   Cruz Infante discharge to home/self care.                   Follow-up Information       Follow up With Specialties Details Why Contact Info Additional Information    CHAPO Dsouza Nurse Practitioner Go in 1 week  [address] [address]  [phone]       [facility] Emergency Department Emergency Medicine Go to  If symptoms worsen [address] [facility] [address]  [phone] [facility] Emergency Department, [address], [place], [address]            Discharge Medication List as of 7/25/2024 12:17 PM        CONTINUE these medications which have NOT CHANGED    Details   allopurinol (ZYLOPRIM) 100 mg tablet Take 100 mg by mouth daily., Historical Med      Cyanocobalamin (RA VITAMIN B-12) 1000 MCG/ML LIQD Inject as  directed, Historical Med      finasteride (PROSCAR) 5 mg tablet Starting Thu 4/8/2021, Historical Med      folic acid (FOLVITE) 1 mg tablet Take by mouth, Historical Med      lisinopril (ZESTRIL) 5 mg tablet Take 5 mg by mouth daily , Starting Wed 3/10/2021, Historical Med      Multiple Vitamin (multivitamin) tablet Take 1 tablet by mouth daily, Historical Med      Propylene Glycol (Systane Balance) 0.6 % SOLN Apply to eye, Historical Med      rosuvastatin (CRESTOR) 10 MG tablet Take 10 mg by mouth daily., Historical Med      tamsulosin (FLOMAX) 0.4 mg Starting Thu 4/8/2021, Historical Med      traMADol (ULTRAM) 50 mg tablet Take 1 tablet (50 mg total) by mouth every 6 (six) hours as needed for moderate pain, Starting Fri 4/16/2021, Normal           No discharge procedures on file.    PDMP Review         Value Time User    PDMP Reviewed  Yes 4/16/2021  5:09 PM William Austin MD             ED Provider  Attending physically available and evaluated Cruz Infante. I managed the patient along with the ED Attending.    Electronically Signed by           Thomas Russo DO  07/25/24 2022

## 2024-07-25 NOTE — ED ATTENDING ATTESTATION
7/25/2024    I, Octavio Cuenca DO, saw and evaluated the patient. I have discussed the patient with Dr Russo and agree with his findings, Plan of Care, and MDM as documented in his note, except where noted. All available labs and Radiology studies were reviewed.  I was present for key portions of any procedure(s) performed by Dr Russo, and I was immediately available to provide assistance.     At this point I agree with the current assessment done in the Emergency Department.  I have conducted an independent evaluation of this patient. The history and physical is as follows:    84-year-old male with noted past medical hx presents to the emergency department by EMS after fall at home this morning.  Patient states that he was wheeling his garbage can outside when he slipped and fell on wet grass, striking his left temporal region against the ground.  No LOC.  Had assistance of nearby neighbor to stand after which he was able to ambulate without difficulty.  He reports really no complaints and in particular denies headache/neck pain/chest pain/dyspnea/Barrett pain/nausea/vomiting.  Found incidentally to have fever in the emergency department.  He reports no recent illnesses or changes in health.  Reports very minimal cough over the past day or so, but he did not think much of this.  He has not had sick contacts with anyone within the past 2 weeks.  He does not fall with any regularity or frequency.  Did have traumatic subdural hemorrhage in 2016 secondary to fall which was treated nonsurgically.  He at present does not take any anticoagulant or antiplatelet medications.  Was otherwise in normal state of health until this episode occurred.  ROS as per HPI; otherwise negative.    General: Awake/alert/no distress.   Vital signs and nursing notes reviewed    HENT:  Normocephalic.  Superficial abrasion approximately 0.4 cm left lateral orbit without any deep structures exposed.  No active bleeding.  No visible foreign  body.  External ear/hearing wnl bilaterally.    Eyes:  No bony instability or crepitus of the orbits  PERRLA  EOMI    Neck:  Phonation normal with no stridor/dysphonia.  Normal active ROM.  No palpable masses or thyromegaly.  No overlying skin changes.  No posterior midline tenderness palpation or step-off  Patient's cervical spine can be cleared by [facility] c-spine criteria:  Patient has a normal mental status and is awake and alert with no evidence of intoxication.  The patient has no distracting injuries.  The patient has a normal strength/sensation examination in the upper/lower extremities bilaterally.  There is no posterior midline c-spine ttp or step-off.      Cardiac:  Radial pulses 2+ bilaterally  DP/PT pulses 2+ bilaterally  RRR with s1/s2; no m/r/g.    Pulmonary:   No respiratory distress.  No accessory muscle use  Lungs CTA b/l with no w/c/r    Abdomen:  Flat. Nondistended. Nontender. No palpable masses.  No guarding/rebound ttp.    Skin:  Warm/dry. No diaphoresis.  No visible rashes or skin changes.    MSK:  No T/L-spine tenderness palpation or step-off.  Pelvis stable and nontender to compression    Neuro:  GCS 15.  PERRLA; EOMI. Facial expressions symmetric.  Tongue/uvula midline.  Shoulder shrug equal bilaterally  Strength 5/5 in UE/LE bilaterally  Intact sensation in UE/LE bilaterally.    A/P: Ground-level fall which appears entirely mechanical and explicable given the circumstances with only minimal abrasion on the scalp.  While the mechanism is low energy, he has had a prior subdural hemorrhage with similar injury mechanism which warrants CT of the head.  Incidental finding of fever sufficiently concerning that chest x-ray/UA/respiratory viral testing is reasonable.  Symptomatic management while workup ongoing.  Disposition pending.    ED Course  ED Course as of 07/25/24 1336   Thu Jul 25, 2024   1015 ECG NSR 71 bpm   QRS 86 QTc 432  Normal axis  Nonspecific st changes  No T wave  abnormalities  No Q waves  No ectopy  Interpreted by me   1059 CT completed and awaiting interpretation   1118 Comprehensive metabolic panel   1118 CBC and differential(!)   1137 FLU/RSV/COVID - if FLU/RSV clinically relevant(!)  Positive accounting for fever and mild cough described the patient   1137 UA has been collected; awaiting results  Awaiting CT head.  Anticipate disposition after remainder of workup obtained.   1145 UA w Reflex to Microscopic w Reflex to Culture  No markers of infection   1214 CT head without contrast  FINDINGS:     PARENCHYMA: Nonspecific  decreased attenuation involving periventricular and subcortical white matter, most compatible with mild microangiopathic change. No intracranial mass, mass effect or midline shift. No CT signs of acute infarction.  No acute   parenchymal hemorrhage.     VENTRICLES AND EXTRA-AXIAL SPACES:  Normal for the patient's age.     VISUALIZED ORBITS:  Change of bilateral lens replacement.     PARANASAL SINUSES:  Normal visualized paranasal sinuses.     CALVARIUM AND EXTRACRANIAL SOFT TISSUES: Unremarkable.     IMPRESSION:     No acute intracranial CT abnormality.           1222 No traumatic findings identified.  Patient really has no complaints at present.  He has been vaccinated against COVID multiple times, most recently in June of this year.  Reasonable for discharge given lack of any traumatic findings and lack of any systemic symptoms apart from fever.  Option of Paxlovid was discussed with patient although he declined.  Otherwise can continue with symptomatic management as needed with respect to the infection with follow-up to primary physician.  ED return at any point in the meantime for worsening symptoms.  All questions were answered to satisfaction prior to discharge.  He expressed understanding and agreed to plan.         Critical Care Time  Procedures

## 2024-07-26 LAB
ATRIAL RATE: 71 BPM
P AXIS: 56 DEGREES
PR INTERVAL: 134 MS
QRS AXIS: 15 DEGREES
QRSD INTERVAL: 86 MS
QT INTERVAL: 398 MS
QTC INTERVAL: 432 MS
T WAVE AXIS: 70 DEGREES
VENTRICULAR RATE: 71 BPM

## 2024-07-26 PROCEDURE — 93010 ELECTROCARDIOGRAM REPORT: CPT | Performed by: INTERNAL MEDICINE

## (undated) DEVICE — 3M™ STERI-STRIP™ COMPOUND BENZOIN TINCTURE 40 BAGS/CARTON 4 CARTONS/CASE C1544: Brand: 3M™ STERI-STRIP™

## (undated) DEVICE — ADHESIVE SKIN HIGH VISCOSITY EXOFIN 1ML

## (undated) DEVICE — 3000CC GUARDIAN II: Brand: GUARDIAN

## (undated) DEVICE — GLOVE SRG BIOGEL ECLIPSE 7

## (undated) DEVICE — INTENDED FOR TISSUE SEPARATION, AND OTHER PROCEDURES THAT REQUIRE A SHARP SURGICAL BLADE TO PUNCTURE OR CUT.: Brand: BARD-PARKER ® CARBON RIB-BACK BLADES

## (undated) DEVICE — SUT MONOCRYL 4-0 PS-2 27 IN Y426H

## (undated) DEVICE — MEDI-VAC YANK SUCT HNDL W/TPRD BULBOUS TIP: Brand: CARDINAL HEALTH

## (undated) DEVICE — ANTIBACTERIAL UNDYED BRAIDED (POLYGLACTIN 910), SYNTHETIC ABSORBABLE SUTURE: Brand: COATED VICRYL

## (undated) DEVICE — CHLORAPREP HI-LITE 26ML ORANGE

## (undated) DEVICE — 3M™ STERI-STRIP™ REINFORCED ADHESIVE SKIN CLOSURES, R1547, 1/2 IN X 4 IN (12 MM X 100 MM), 6 STRIPS/ENVELOPE: Brand: 3M™ STERI-STRIP™

## (undated) DEVICE — TUBING SUCTION 5MM X 12 FT

## (undated) DEVICE — PAD GROUNDING ADULT

## (undated) DEVICE — SKIN MARKER DUAL TIP WITH RULER CAP, FLEXIBLE RULER AND LABELS: Brand: DEVON

## (undated) DEVICE — BETHLEHEM UNIVERSAL MINOR GEN: Brand: CARDINAL HEALTH

## (undated) DEVICE — SUT ETHILON 2-0 FSLX 30 IN 1674H

## (undated) DEVICE — SUT SILK 2-0 SH 30 IN K833H

## (undated) DEVICE — GLOVE INDICATOR PI UNDERGLOVE SZ 7 BLUE

## (undated) DEVICE — SUT VICRYL 3-0 SH 27 IN J416H

## (undated) DEVICE — PLUMEPEN PRO 10FT

## (undated) DEVICE — ELECTRODE BLADE MOD E-Z CLEAN 2.5IN 6.4CM -0012M

## (undated) DEVICE — 3M™ TEGADERM™ TRANSPARENT FILM DRESSING FRAME STYLE, 1626W, 4 IN X 4-3/4 IN (10 CM X 12 CM), 50/CT 4CT/CASE: Brand: 3M™ TEGADERM™

## (undated) DEVICE — INTENDED FOR TISSUE SEPARATION, AND OTHER PROCEDURES THAT REQUIRE A SHARP SURGICAL BLADE TO PUNCTURE OR CUT.: Brand: BARD-PARKER SAFETY BLADES SIZE 15, STERILE

## (undated) DEVICE — GAUZE SPONGES,USP TYPE VII GAUZE, 12 PLY: Brand: CURITY

## (undated) DEVICE — ELECTRODE NEEDLE MOD E-Z CLEAN 2.75IN 7CM -0013M